# Patient Record
Sex: FEMALE | Race: WHITE | Employment: UNEMPLOYED | URBAN - METROPOLITAN AREA
[De-identification: names, ages, dates, MRNs, and addresses within clinical notes are randomized per-mention and may not be internally consistent; named-entity substitution may affect disease eponyms.]

---

## 2022-06-24 ENCOUNTER — OFFICE VISIT (OUTPATIENT)
Dept: OBGYN CLINIC | Age: 32
End: 2022-06-24
Payer: COMMERCIAL

## 2022-06-24 VITALS
WEIGHT: 132.2 LBS | SYSTOLIC BLOOD PRESSURE: 110 MMHG | DIASTOLIC BLOOD PRESSURE: 60 MMHG | HEIGHT: 63 IN | BODY MASS INDEX: 23.42 KG/M2

## 2022-06-24 DIAGNOSIS — Z32.01 POSITIVE PREGNANCY TEST: ICD-10-CM

## 2022-06-24 DIAGNOSIS — O36.80X0 PREGNANCY WITH INCONCLUSIVE FETAL VIABILITY, SINGLE OR UNSPECIFIED FETUS: Primary | ICD-10-CM

## 2022-06-24 LAB
HCG, PREGNANCY, URINE, POC: POSITIVE
VALID INTERNAL CONTROL, POC: YES

## 2022-06-24 PROCEDURE — 99204 OFFICE O/P NEW MOD 45 MIN: CPT | Performed by: NURSE PRACTITIONER

## 2022-06-24 PROCEDURE — G8427 DOCREV CUR MEDS BY ELIG CLIN: HCPCS | Performed by: NURSE PRACTITIONER

## 2022-06-24 PROCEDURE — 1036F TOBACCO NON-USER: CPT | Performed by: NURSE PRACTITIONER

## 2022-06-24 PROCEDURE — G8420 CALC BMI NORM PARAMETERS: HCPCS | Performed by: NURSE PRACTITIONER

## 2022-06-24 PROCEDURE — 76817 TRANSVAGINAL US OBSTETRIC: CPT | Performed by: NURSE PRACTITIONER

## 2022-06-24 PROCEDURE — 81025 URINE PREGNANCY TEST: CPT | Performed by: NURSE PRACTITIONER

## 2022-06-24 RX ORDER — LAMOTRIGINE 200 MG/1
200 TABLET ORAL 2 TIMES DAILY
COMMUNITY

## 2022-06-24 NOTE — PROGRESS NOTES
The patient is a 34 y.o. No obstetric history on file. who is seen to discuss her new pregnancy. Pt denies any current  cramping, urinary symptoms, or vaginal bleeding. She is currently taking an over the counter PNV with DHA and folic acid. She states she is having some LLQ discomfort, comes and goes but is present and noticeable. This pregnancy has not been confirmed IUP. Reports overall regular and monthly cycles, but sometimes had longer cycle intervals. PT reports she had a neg UPT on . She did not get a period in May. Pt reports she was not having sex during the time for her to conceive to be 8wks pregnant as calculated by lmp, believes she is not that far along. With hx SAB around 8wks, had a D&C.  2020- living child . LMP: 22   VINICIO based off of LMP: 23  GA today: Liu Zac findings 21  +IUP  GS seen measuring 5w4d  YS visualized  ROV visualized with follicles and CL cyst  LOV is visualized with follicles  Uterus is antevered  Cx= 3.73cm      HISTORY:    No obstetric history on file. No LMP recorded. Sexual History:  single partner, contraception - none  Contraception:  none  No current outpatient medications on file prior to visit. No current facility-administered medications on file prior to visit. ROS:  Feeling well. No dyspnea or chest pain on exertion. No abdominal pain, change in bowel habits, black or bloody stools. No urinary tract symptoms. GYN ROS: she complains of early pregnancy with LLQ discomfort. Felipe Steel PHYSICAL EXAM:  There were no vitals taken for this visit. The patient appears well, alert, oriented x 3, in no distress. Exam deferred, talk only    ASSESSMENT:  No diagnosis found. PLAN:  Dos and Don'ts of pregnancy discussed. Pain, bleeding precautions given  Continue PNV and lamictal, disc benefit>risk of this medication.   Pt reports she is leaving town next week for the next month and will be unable to rtc at 8wks for viability scan and nob talk. We will bring her back upon her return Aug1 for nob talk/us  In the mean time, bleeding and pain precautions given and she will seek care with providers in [facility] where she will be for the next month should she experience bleeding or increased pain. All questions answered  Diagnosis explained in detail, including differential    No orders of the defined types were placed in this encounter. Supervising physician is Dr. Radha Sutherland.   45 min chart review, US review, counseling and documentaiton

## 2022-07-15 LAB
ABO, EXTERNAL RESULT: NORMAL
C. TRACHOMATIS, EXTERNAL RESULT: NEGATIVE
HEP B, EXTERNAL RESULT: NORMAL
HEPATITIS C ANTIBODY, EXTERNAL RESULT: NORMAL
HIV, EXTERNAL RESULT: NORMAL
N. GONORRHOEAE, EXTERNAL RESULT: NEGATIVE
RH FACTOR, EXTERNAL RESULT: POSITIVE
RPR, EXTERNAL RESULT: NORMAL

## 2022-08-03 ENCOUNTER — TELEPHONE (OUTPATIENT)
Dept: OBGYN CLINIC | Age: 32
End: 2022-08-03

## 2022-08-03 DIAGNOSIS — O99.351 SEIZURE DISORDER IN PREGNANCY, ANTEPARTUM, FIRST TRIMESTER (HCC): Primary | ICD-10-CM

## 2022-08-03 DIAGNOSIS — G40.909 SEIZURE DISORDER IN PREGNANCY, ANTEPARTUM, FIRST TRIMESTER (HCC): Primary | ICD-10-CM

## 2022-08-03 NOTE — TELEPHONE ENCOUNTER
Per Tc Garza can offer appointment. Patient to have records with her from previous OB practice, or will have them faxed prior to appt. Referral placed to [facility] so patient can get in for NT scan in timely matter.

## 2022-08-03 NOTE — TELEPHONE ENCOUNTER
Patient calling stating she was seen here previously at 5 weeks pregnant. She was in between moving here from [place]. She was seen in [place] and had initial [address] and blood work. VINICIO 2/22/23. She is currently 11 weeks. She is calling because she is know here for remainder of pregnancy. She is asking to make appointment to come in. She is currently taking Lamictal and has been seeing a Neurologist in [place].

## 2022-08-09 DIAGNOSIS — O09.91 HIGH-RISK PREGNANCY IN FIRST TRIMESTER: ICD-10-CM

## 2022-08-09 PROBLEM — N39.0 UTI (URINARY TRACT INFECTION): Status: ACTIVE | Noted: 2022-08-09

## 2022-08-09 PROBLEM — O09.90 HIGH-RISK PREGNANCY: Status: ACTIVE | Noted: 2022-08-09

## 2022-08-09 PROBLEM — G40.909 EPILEPSY (HCC): Status: ACTIVE | Noted: 2022-08-09

## 2022-08-09 RX ORDER — LANOLIN ALCOHOL/MO/W.PET/CERES
400 CREAM (GRAM) TOPICAL DAILY
COMMUNITY

## 2022-08-10 ENCOUNTER — INITIAL PRENATAL (OUTPATIENT)
Dept: OBGYN CLINIC | Age: 32
End: 2022-08-10

## 2022-08-10 VITALS — BODY MASS INDEX: 25.33 KG/M2 | WEIGHT: 143 LBS

## 2022-08-10 DIAGNOSIS — O09.91 HIGH-RISK PREGNANCY IN FIRST TRIMESTER: ICD-10-CM

## 2022-08-10 DIAGNOSIS — Z34.81 PRENATAL CARE, SUBSEQUENT PREGNANCY, FIRST TRIMESTER: ICD-10-CM

## 2022-08-10 DIAGNOSIS — Z3A.12 12 WEEKS GESTATION OF PREGNANCY: ICD-10-CM

## 2022-08-10 DIAGNOSIS — Z87.440 HISTORY OF UTI: Primary | ICD-10-CM

## 2022-08-10 NOTE — PROGRESS NOTES
Carolina Herrera G3, P1 presents to the office today for NOB talk/transfer in. EDC is 2/22/23 based off of outside records. Patient education was discussed including: nutrition, appropriate weight gain, diet, exercise, travel, hospital classes, breastfeeding/lactation services, flu vaccine, Tdap, glucola, GBS, and Corona Virus and Zika precautions. Genetic testing discussed in depth and patient elects NT and NIPT-drawn today. Patients past medical history is significant for seizures-sees Neuro in [place], but has not seen since 2020, currently taking Lamictal 200 mg bid. She is scheduled to see [facility] on 8/18. She had ecoli UTI and was treated, repeat UC today. She is to return to the office in 1-2 weeks for NOB exam. All questions answered and she voiced full understanding. She is encouraged to call the office with any questions or concerns.

## 2022-08-13 LAB
BACTERIA SPEC CULT: NORMAL
BACTERIA SPEC CULT: NORMAL
SERVICE CMNT-IMP: NORMAL

## 2022-08-17 ENCOUNTER — ROUTINE PRENATAL (OUTPATIENT)
Dept: OBGYN CLINIC | Age: 32
End: 2022-08-17

## 2022-08-17 VITALS — WEIGHT: 144.8 LBS | SYSTOLIC BLOOD PRESSURE: 128 MMHG | DIASTOLIC BLOOD PRESSURE: 72 MMHG | BODY MASS INDEX: 25.65 KG/M2

## 2022-08-17 DIAGNOSIS — Z3A.13 13 WEEKS GESTATION OF PREGNANCY: ICD-10-CM

## 2022-08-17 DIAGNOSIS — Z34.82 PRENATAL CARE, SUBSEQUENT PREGNANCY, SECOND TRIMESTER: ICD-10-CM

## 2022-08-17 DIAGNOSIS — Z11.3 SCREENING EXAMINATION FOR STD (SEXUALLY TRANSMITTED DISEASE): ICD-10-CM

## 2022-08-17 DIAGNOSIS — Z11.51 SCREENING FOR HUMAN PAPILLOMAVIRUS (HPV): Primary | ICD-10-CM

## 2022-08-17 DIAGNOSIS — Z12.4 SCREENING FOR CERVICAL CANCER: ICD-10-CM

## 2022-08-17 DIAGNOSIS — Z13.89 SCREENING FOR GENITOURINARY CONDITION: ICD-10-CM

## 2022-08-17 DIAGNOSIS — G40.909 NONINTRACTABLE EPILEPSY WITHOUT STATUS EPILEPTICUS, UNSPECIFIED EPILEPSY TYPE (HCC): ICD-10-CM

## 2022-08-17 PROBLEM — F90.9 ATTENTION DEFICIT HYPERACTIVITY DISORDER: Status: ACTIVE | Noted: 2022-08-17

## 2022-08-17 PROBLEM — O98.511 HERPES INFECTION DURING PREGNANCY IN FIRST TRIMESTER: Status: ACTIVE | Noted: 2022-08-17

## 2022-08-17 PROBLEM — B00.9 HERPES INFECTION DURING PREGNANCY IN FIRST TRIMESTER: Status: ACTIVE | Noted: 2022-08-17

## 2022-08-17 PROBLEM — O99.351 EPILEPSY COMPLICATING PREGNANCY IN FIRST TRIMESTER (HCC): Status: ACTIVE | Noted: 2022-08-09

## 2022-08-17 PROBLEM — O09.91 HIGH-RISK PREGNANCY IN FIRST TRIMESTER: Status: ACTIVE | Noted: 2022-08-09

## 2022-08-17 LAB
GLUCOSE URINE, POC: NEGATIVE
PROTEIN,URINE, POC: NEGATIVE

## 2022-08-17 PROCEDURE — 99902 PR PRENATAL VISIT: CPT | Performed by: OBSTETRICS & GYNECOLOGY

## 2022-08-17 RX ORDER — VALACYCLOVIR HYDROCHLORIDE 1 G/1
TABLET, FILM COATED ORAL
COMMUNITY
End: 2022-08-18

## 2022-08-17 RX ORDER — DEXTROAMPHETAMINE SACCHARATE, AMPHETAMINE ASPARTATE, DEXTROAMPHETAMINE SULFATE AND AMPHETAMINE SULFATE 5; 5; 5; 5 MG/1; MG/1; MG/1; MG/1
TABLET ORAL
COMMUNITY
End: 2022-08-18

## 2022-08-17 NOTE — PROGRESS NOTES
Ms. Amber Leon  presents today for her initial OB exam.  Last week has nipt drawn. Seeing mfm tomorrow. Her last preg in [place] saw neurology and mfm. Had lamictal levels checked. She has kept same neurologist in [address] [facility] [place]. Pap smear:2 years ago, will repeat today  Genetic: performed last week     Patient's last menstrual period was 2022 (exact date). Estimated Date of Delivery: 23  OB History:   OB History    Para Term  AB Living   3 1 1 0 1 1   SAB IAB Ectopic Molar Multiple Live Births   1 0 0 0 0 1      # Outcome Date GA Lbr Ousmane/2nd Weight Sex Delivery Anes PTL Lv   3 Current            2 Term 20 39w0d  7 lb (3.175 kg) F Vag-Spont EPI  MOHAMUD   1 SAB 2019 8w0d              Past Gyn History:   GYN History       Cycles 30-42 days. No abnl pap or stis. Allergies:   No Known Allergies    Medication History:  Current Outpatient Medications   Medication Sig Dispense Refill    folic acid (FOLVITE) 059 MCG tablet Take 400 mcg by mouth in the morning. lamoTRIgine (LAMICTAL) 200 MG tablet Take 200 mg by mouth daily      Prenat-Fe Carbonyl-FA-Omega 3 (ONE-A-DAY WOMENS PRENATAL 1 PO) Take by mouth       No current facility-administered medications for this visit. Past Medical History:  Past Medical History:   Diagnosis Date    Epilepsy ([address]    Probably has twitches daily but 5 years since last big grand mal seizure. Did not adjust dose of lamictal last pregnancy. Past Surgical History:  Past Surgical History:   Procedure Laterality Date    KNEE ARTHROSCOPY         Social History:  Social History     Socioeconomic History    Marital status:  - 2 years      Spouse name: Not on file    Number of children: Not on file    Years of education: Not on file    Highest education level: Not on file   Occupational History    Homemaker now.   Was a  in [place]    Tobacco Use    Smoking status: Never    Smokeless tobacco: Never   Vaping Use    Vaping Use: Never used   Substance and Sexual Activity    Alcohol use: Not Currently    Drug use: Never    Sexual activity: Yes     Partners: Male   Other Topics Concern    Exercise:  hot yoga in past    Social History Narrative    Not on file     Social Determinants of Health     Financial Resource Strain: Not on file   Food Insecurity: Not on file   Transportation Needs: Not on file   Physical Activity: Not on file   Stress: Not on file   Social Connections: Not on file   Intimate Partner Violence: Not on file   Housing Stability: Not on file       Family History:  Family History   Problem Relation Age of Onset    Heart Attack Paternal Grandfather     Heart Attack Paternal Grandmother            Review of Systems      A comprehensive review of systems was negative except for that written in the history of present illness. /72   Wt 144 lb 12.8 oz (65.7 kg)   LMP 04/26/2022 (Exact Date)   BMI 25.65 kg/m²   Protein, Urine, POC   Date Value Ref Range Status   08/17/2022 Negative Negative Final     General: well nourished well developed female in nad   Heart rrr  Lungs cta b&s  Abdomen soft nontender. No enlargement of liver. Extremities: no calf pain  Pelvic exam: normal external genitalia, vulva, vagina, cervix, uterus and adnexa. Breasts: breasts appear normal, no suspicious masses, no skin or nipple changes or axillary nodes. Assessment and Plan:     Diagnoses and all orders for this visit:    Screening for human papillomavirus (HPV)  -     PAP IG, CT-NG-TV, Aptima HPV and rfx 90/51,91([id number]); Future  -     PAP IG, CT-NG-TV, Aptima HPV and rfx 50/48,19([id number])    Screening examination for STD (sexually transmitted disease)  -     PAP IG, CT-NG-TV, Aptima HPV and rfx 08/92,01([id number]);  Future  -     PAP IG, CT-NG-TV, Aptima HPV and rfx 69/67,59([id number])    Screening for cervical cancer  -     PAP IG, CT-NG-TV, Aptima HPV and rfx 14/98,52([id number]); Future  -     PAP IG, CT-NG-TV, Aptima HPV and rfx 29/37,95([id number])    Screening for genitourinary condition  -     AMB POC OB URINE DIP  -     Culture, Urine; Future  -     Culture, Urine    13 weeks gestation of pregnancy  -     AMB POC OB URINE DIP  -     PAP IG, CT-NG-TV, Aptima HPV and rfx 03/62,64([id number]); Future  -     Culture, Urine; Future  -     Culture, Urine  -     PAP IG, CT-NG-TV, Aptima HPV and rfx 16/18,45([id number])    Prenatal care, subsequent pregnancy, second trimester  -     AMB POC OB URINE DIP  -     PAP IG, CT-NG-TV, Aptima HPV and rfx 60/99,72([id number]); Future  -     Culture, Urine; Future  -     Culture, Urine  -     PAP IG, CT-NG-TV, Aptima HPV and rfx 16/18,45([id number])    Nonintractable epilepsy without status epilepticus, unspecified epilepsy type ([facility]  -     Lamotrigine Level; Future  -     Lamotrigine Level        Counseling was performed regarding expected weight gain, exercise, risks of PTL,  avoidance of cat feces and raw material.  Awaiting genetic testing results. All questions were answered. Has not  with lamictal.  Will check lamictal level. Consider neurology referral here. Mfm appt tomorrow. RTC 4 weeks.

## 2022-08-18 ENCOUNTER — ROUTINE PRENATAL (OUTPATIENT)
Dept: OBGYN CLINIC | Age: 32
End: 2022-08-18
Payer: COMMERCIAL

## 2022-08-18 VITALS — DIASTOLIC BLOOD PRESSURE: 72 MMHG | SYSTOLIC BLOOD PRESSURE: 118 MMHG

## 2022-08-18 DIAGNOSIS — G40.909: ICD-10-CM

## 2022-08-18 DIAGNOSIS — O99.351: ICD-10-CM

## 2022-08-18 DIAGNOSIS — B00.9 HERPES INFECTION DURING PREGNANCY IN FIRST TRIMESTER: ICD-10-CM

## 2022-08-18 DIAGNOSIS — F90.9 ATTENTION DEFICIT HYPERACTIVITY DISORDER (ADHD), UNSPECIFIED ADHD TYPE: ICD-10-CM

## 2022-08-18 DIAGNOSIS — Z36.82 NUCHAL TRANSLUCENCY OF FETUS ON PRENATAL ULTRASOUND: ICD-10-CM

## 2022-08-18 DIAGNOSIS — O98.511 HERPES INFECTION DURING PREGNANCY IN FIRST TRIMESTER: ICD-10-CM

## 2022-08-18 DIAGNOSIS — O09.91 HIGH-RISK PREGNANCY IN FIRST TRIMESTER: Primary | ICD-10-CM

## 2022-08-18 LAB — LAMOTRIGINE SERPL-MCNC: <1 UG/ML (ref 2–20)

## 2022-08-18 PROCEDURE — G8427 DOCREV CUR MEDS BY ELIG CLIN: HCPCS | Performed by: OBSTETRICS & GYNECOLOGY

## 2022-08-18 PROCEDURE — 99204 OFFICE O/P NEW MOD 45 MIN: CPT | Performed by: OBSTETRICS & GYNECOLOGY

## 2022-08-18 PROCEDURE — 1036F TOBACCO NON-USER: CPT | Performed by: OBSTETRICS & GYNECOLOGY

## 2022-08-18 PROCEDURE — 76801 OB US < 14 WKS SINGLE FETUS: CPT | Performed by: OBSTETRICS & GYNECOLOGY

## 2022-08-18 PROCEDURE — G8419 CALC BMI OUT NRM PARAM NOF/U: HCPCS | Performed by: OBSTETRICS & GYNECOLOGY

## 2022-08-18 PROCEDURE — 76813 OB US NUCHAL MEAS 1 GEST: CPT | Performed by: OBSTETRICS & GYNECOLOGY

## 2022-08-18 ASSESSMENT — PATIENT HEALTH QUESTIONNAIRE - PHQ9
SUM OF ALL RESPONSES TO PHQ9 QUESTIONS 1 & 2: 0
SUM OF ALL RESPONSES TO PHQ QUESTIONS 1-9: 0
SUM OF ALL RESPONSES TO PHQ QUESTIONS 1-9: 0
2. FEELING DOWN, DEPRESSED OR HOPELESS: 0
SUM OF ALL RESPONSES TO PHQ QUESTIONS 1-9: 0
SUM OF ALL RESPONSES TO PHQ QUESTIONS 1-9: 0
1. LITTLE INTEREST OR PLEASURE IN DOING THINGS: 0

## 2022-08-18 NOTE — PROGRESS NOTES
MFM Consultation    Tia Giraldo (: 1990) is a 34 y.o. Curry Sprain at 13w1d with 2023, by Ultrasound. Presents for evaluation of the following chief complaint(s):  Pregnancy Ultrasound and High Risk Pregnancy (NT, Hx Epilepsy, Hx ADD)     Patient is not working outside of home. Pt has long-standing hx epilepsy, last grand mal 5yr ago, has tremors when fatigued. Previously cared for in [facility], does not have neuro locally. No issues in prior pregnancy. Hx ADHD, not on meds currently; not impacting ADL significantly. Scheduled to see Primary OB ([facility]) on 22. No HAs, edema. Denies preeclamptic symptoms. No bleeding, LOF, cramping, ctxs, or vaginal pressure. Review of Systems - per HPI; otherwise unremarkable. History reviewed and updated as needed. Exam:   Vitals:    22 1555   BP: 118/72     Ultrasound: Please see formal ultrasound report under imaging tab. ASSESSMENT/PLAN:  Patient Active Problem List    Diagnosis Date Noted    High-risk pregnancy in first trimester 2022     Priority: High     NIPT-drawn 8/10. Scheduled to see MFM on  for NT. Transfer in @ 12w0d from [facility] in [place], [place]  22 at [facility]: Normal NT and nasal bone. Genetic counseling done by MD. NIPT drawn 8/10. F/U MFM 20 weeks anatomy and echo  Low dose Aspirin 162  mg daily is recommended to be started at 12-16 weeks (some benefit seen with starting up to 28 weeks) for the prevention of preeclampsia  in high risk women.  (U.S. Preventative Services Task Force, Annals of Internal Medicine 0484)    Stop Aspirin at 36 weeks. Start Vitamin D 2000IU daily and Calcium 1000mg daily (or may take Viactiv calcium chews x 2 per day) to aid in protection of bones  and teeth.       Attention deficit hyperactivity disorder 2022     Priority: Medium    Epilepsy complicating pregnancy in first trimester [facility]) 2022     Priority: Medium     Hx of Epilepsy 2004  Stable on Lamictal 200mg-sees Neuro in [place] seen in July 2020. Last seizure 5+ years ago(Grand Mal) lamictal level every trimester. 8/18/22 [facility]: lamictal level pending, however pt had not taken her medication that day prior to draw. Rec establishing with local neurology. UTI (urinary tract infection) 08/09/2022     Priority: Low     7/15/22 50,000-100,000 E-Coli noted-patient was treated. [facility]-         Genetic counseling was performed by physician after reviewing patient's genetic history. The patient's Down syndrome age associated risk, as well as, risks of additional aneuploidy and genetic syndromes, are reduced by approximately 50% with a normal first trimester anatomy including, nuchal translucency and nasal bone. Ultrasound alone does not rule out all abnormalities of genetics and development. Maternal serum screening for aneuploidy was discussed with the patient including first trimester SALBADOR-A/hCG, second trimester Quad screen (either in isolation or sequential with SALBADOR-A) as well as non-invasive prenatal testing (NIPT) for aneuploidy from a maternal blood sample. Positive predictive and negative predictive values for these tests were explained, questions answered. Patient understands that these are screening tests that only assesses risk for select abnormalities (trisomies 15, 25, and 21, and sex chromosome abnormalities (NIPT), as well as markers for placental health (SALBADOR-A) and risk for open neural tube defects (quad)). NIPT is designed for high risk populations, but should be considered by all patients who desire the current best option for screening for applicable genetic abnormalities. Limitations of technology discussed based on maternal age, technical aspects of tests, and maternal BMI reviewed. All questions answered and concerns discussed. Patient elected to proceed with NIPT previously at [facility] office- results pending.       2) Seizure Disorders/Epilepsy in Pregnancy    Low serum folate levels in women with epilepsy are independently associated with an increased risk of major fetal malformations. ACOG recommends supplementation with 4mg per day of folic acid in populations at high risk for neural tube defects, including those with epilepsy and on antiepileptic medications. It is preferable for this medication to begin prior to conception. Monotherapy should be the goal of epilepsy treatment of women trying to get pregnant. Children of women taking multiple antiepileptic agents are at greater risk for congenital malformations and developmental delays (6-8.6% incidence). Antiepileptic drug regimen should be optimized preconception if possible. Once a woman becomes pregnant, changes in antiepileptic therapy should not be undertaken for the sole purpose of reducing teratogenic risk. Such a change usually occurs too late in pregnancy to make an appreciable difference in fetal exposure and could precipitate maternal seizures that might be equally damaging to the fetus. If possible, the older antiseizure medications (Valproic Acid, Phenytoin and Carbamazepine) should be stopped prior to pregnancy and another agent started. These medications have an association of spinal defects (VA and C) and facial clefting or heart defects (P). The newer atypical agents (keppra and lamictal) are felt to be safer in pregnancy with reduced incidence of major malformations. Lamotrigine may be associated with cleft lip/palate. However, none of the newer anti-epileptic agents have adequate numbers of exposed pregnancies to make recommendations. Due to metabolic changes in pregnancy, patients on keppra and lamictal should have drug levels monitored throughout pregnancy.      Screening for ONTD may be done in two ways  Level 2 anatomy ultrasound should be performed at 18 to 20 weeks to evaluate for neural tube defects, cleft lip and palate, heart anomalies, and for a general fetal anatomical survey. Elevated serum AFP is associated with neural tube defects and other fetal abnormalities (eg ventral wall defects, congenital nephrosis). Measurement of the serum AFP concentration or amniocentesis for alpha-fetoprotein should be performed between 14 and 16 weeks, especially in women treated with valproate and carbamazepine. However, this laboratory value does not improve detection over that of a level 2 ultrasound. Lamictal level will need to be adjusted as pregnancy progresses. Level pending, however, was trough not peak draw. 3) Pt with long history of ADHD. This has been well-controlled prior to pregnancy. Patient currently uses many nonpharmacologic interventions with sufficient control to perform ADL sufficiently. Most data on this class of medication is combined with research of illicit amphetamine use this clouds the ability to identify true impact on fetal development. Risk of growth lag, unknown long-term impact on fetal brain reviewed. All questions answered. At this time, no pharmacologic therapy necessary. Addressed normal pregnancy complaints, reassured and offered suggestions for care  Reviewed gestational age precautions and activity goals/limitations  Nutritional counseling as well as specific goals based on current maternal and fetal status  Options for GERD therapy if becomes symptomatic over course of pregnancy  Gestational age appropriate preventive care regarding communicable disease transmission and vaccines as appropriate (including flu, TDaP, and COVID.)  Additional plans and concerns as documented in problem list.   All questions answered and concerns discussed.       I have spent 50 minutes reviewing previous notes, test results and face to face with the patient discussing the diagnosis and importance of compliance with the treatment plan, in addition to ultrasound findings, as well as documenting on the day of the visit (08/18/2022)      Return in 7 week(s)  Anatomy    Patient Active Problem List   Diagnosis Code    Epilepsy complicating pregnancy in first trimester ([facility] O99.351, G40.909    High-risk pregnancy in first trimester O09.91    UTI (urinary tract infection) N39.0    Attention deficit hyperactivity disorder F90.9       An electronic signature was used to authenticate this note.   Ermelinda Hwang MD

## 2022-08-20 LAB
BACTERIA SPEC CULT: NORMAL
SERVICE CMNT-IMP: NORMAL

## 2022-08-22 ENCOUNTER — TELEPHONE (OUTPATIENT)
Dept: OBGYN CLINIC | Age: 32
End: 2022-08-22

## 2022-08-22 NOTE — TELEPHONE ENCOUNTER
----- Message from Ran Lujan DO sent at 8/22/2022 10:14 AM EDT -----  Regarding: RE: lamictal  Well, need to take meds. Repeat level 2 weeks if pt compliant. Ragini vargas   ----- Message -----  From: Jasonsudarshan Solitario  Sent: 8/19/2022  12:47 PM EDT  To: Ragini Vargas DO  Subject: lamictal                                         Patient states it had been 48 hours since she had taken her meds when she had levels checked. She states she was not compliant with taking the meds. Since then has been compliant with taking. Just want to make sure you still want to increase?  ----- Message -----  From: Ran Lujan DO  Sent: 8/19/2022  12:23 PM EDT  To: Hannah Gonzalez    Her lamictal level is low. Needs to go up to 250mg daily. Repeat level 4 weeks. ----- Message -----  From: Fide Lynn MD  Sent: 8/19/2022  10:39 AM EDT  To: Ragini Vargas DO    I agree on both counts. (Will do yesterday's note soon. )    ----- Message -----  From: Ran Lujan DO  Sent: 8/18/2022   5:48 PM EDT  To: Fide Lynn MD    She is taking lamictal for seizure do. I think she needs neurology here. I am going to increase her dose from 200mg to 225? Once source said max dose 200mg per day and another said 400mg?

## 2022-08-22 NOTE — TELEPHONE ENCOUNTER
Called patient and informed to be compliant with taking meds, and we will recheck levels at next visit. Patient v/u.

## 2022-08-23 LAB
C TRACH RRNA CVX QL NAA+PROBE: NEGATIVE
CYTOLOGIST CVX/VAG CYTO: NORMAL
CYTOLOGY CVX/VAG DOC THIN PREP: NORMAL
HPV APTIMA: NEGATIVE
Lab: NORMAL
N GONORRHOEA RRNA CVX QL NAA+PROBE: NEGATIVE
PATH REPORT.FINAL DX SPEC: NORMAL
STAT OF ADQ CVX/VAG CYTO-IMP: NORMAL
T VAGINALIS RRNA SPEC QL NAA+PROBE: NEGATIVE

## 2022-09-12 ENCOUNTER — ROUTINE PRENATAL (OUTPATIENT)
Dept: OBGYN CLINIC | Age: 32
End: 2022-09-12

## 2022-09-12 VITALS — WEIGHT: 152.8 LBS | BODY MASS INDEX: 27.07 KG/M2 | SYSTOLIC BLOOD PRESSURE: 112 MMHG | DIASTOLIC BLOOD PRESSURE: 60 MMHG

## 2022-09-12 DIAGNOSIS — Z13.89 SCREENING FOR GENITOURINARY CONDITION: ICD-10-CM

## 2022-09-12 DIAGNOSIS — O99.352 EPILEPSY COMPLICATING PREGNANCY IN SECOND TRIMESTER (HCC): ICD-10-CM

## 2022-09-12 DIAGNOSIS — G40.909 EPILEPSY COMPLICATING PREGNANCY IN SECOND TRIMESTER (HCC): ICD-10-CM

## 2022-09-12 DIAGNOSIS — O09.92 HIGH-RISK PREGNANCY IN SECOND TRIMESTER: Primary | ICD-10-CM

## 2022-09-12 DIAGNOSIS — Z3A.16 16 WEEKS GESTATION OF PREGNANCY: ICD-10-CM

## 2022-09-12 LAB
GLUCOSE URINE, POC: NEGATIVE
PROTEIN,URINE, POC: NEGATIVE

## 2022-09-12 PROCEDURE — 99902 PR PRENATAL VISIT: CPT | Performed by: OBSTETRICS & GYNECOLOGY

## 2022-09-14 LAB — LAMOTRIGINE SERPL-MCNC: 2.8 UG/ML (ref 2–20)

## 2022-09-21 PROBLEM — N39.0 UTI (URINARY TRACT INFECTION): Status: RESOLVED | Noted: 2022-08-09 | Resolved: 2022-09-21

## 2022-10-04 ENCOUNTER — ROUTINE PRENATAL (OUTPATIENT)
Dept: OBGYN CLINIC | Age: 32
End: 2022-10-04
Payer: COMMERCIAL

## 2022-10-04 VITALS — DIASTOLIC BLOOD PRESSURE: 59 MMHG | SYSTOLIC BLOOD PRESSURE: 107 MMHG

## 2022-10-04 DIAGNOSIS — O09.92 HIGH-RISK PREGNANCY IN SECOND TRIMESTER: ICD-10-CM

## 2022-10-04 DIAGNOSIS — G40.909 EPILEPSY AFFECTING PREGNANCY IN SECOND TRIMESTER (HCC): ICD-10-CM

## 2022-10-04 DIAGNOSIS — O44.42 LOW-LYING PLACENTA IN SECOND TRIMESTER: ICD-10-CM

## 2022-10-04 DIAGNOSIS — O99.352 EPILEPSY AFFECTING PREGNANCY IN SECOND TRIMESTER (HCC): ICD-10-CM

## 2022-10-04 PROCEDURE — G8484 FLU IMMUNIZE NO ADMIN: HCPCS | Performed by: OBSTETRICS & GYNECOLOGY

## 2022-10-04 PROCEDURE — G8419 CALC BMI OUT NRM PARAM NOF/U: HCPCS | Performed by: OBSTETRICS & GYNECOLOGY

## 2022-10-04 PROCEDURE — 93325 DOPPLER ECHO COLOR FLOW MAPG: CPT | Performed by: OBSTETRICS & GYNECOLOGY

## 2022-10-04 PROCEDURE — 99214 OFFICE O/P EST MOD 30 MIN: CPT | Performed by: OBSTETRICS & GYNECOLOGY

## 2022-10-04 PROCEDURE — G8427 DOCREV CUR MEDS BY ELIG CLIN: HCPCS | Performed by: OBSTETRICS & GYNECOLOGY

## 2022-10-04 PROCEDURE — 76825 ECHO EXAM OF FETAL HEART: CPT | Performed by: OBSTETRICS & GYNECOLOGY

## 2022-10-04 PROCEDURE — 76811 OB US DETAILED SNGL FETUS: CPT | Performed by: OBSTETRICS & GYNECOLOGY

## 2022-10-04 PROCEDURE — 1036F TOBACCO NON-USER: CPT | Performed by: OBSTETRICS & GYNECOLOGY

## 2022-10-04 NOTE — PROGRESS NOTES
MFM Consultation    Janis Palmer (: 1990) is a 34 y.o. Mushtaq Ding at [place] [address] with 2023, by Ultrasound. Presents for evaluation of the following chief complaint(s):  Pregnancy Ultrasound and High Risk Pregnancy (Hx Epilepsy, Hx ADD)     Patient is not working outside of home; spouse and 1yo Angélica Lank present today. Pt has long-standing hx epilepsy, last grand mal 5yr ago, has tremors when fatigued. Previously cared for in [facility], does not have neuro locally. No issues in prior pregnancy. Hoping to travel to [facility] in next couple of months to visit. Hx ADHD, not on meds currently; not impacting ADL significantly. Scheduled to see Primary OB ([facility]) on 10/11/22. No HAs, edema. Denies preeclamptic symptoms. No bleeding, LOF, cramping, ctxs, or vaginal pressure. Reports fetal movement. Recent significant poison ivy for ~1mo. Now improving. Interval history since prior appt reviewed and updated as indicated. Review of Systems - per HPI; otherwise unremarkable. Exam:     Vitals:    10/04/22 1510   BP: (!) 107/59     Ultrasound: Please see formal ultrasound report under imaging tab. ASSESSMENT/PLAN:  Patient Active Problem List    Diagnosis Date Noted    High-risk pregnancy in second trimester 2022     Priority: High     NIPT-low risk  Scheduled to see MFM on  for NT. Transfer in @ 12w0d from [facility] in [place], [place]  22 at [facility]: Normal NT and nasal bone. Genetic counseling done by MD. NIPT low risk  10/04/22 at [facility]: Normal anatomy and echo; AC 80%, CAESAR WNL, Dopplers WNL    F/U MFM 8-10 weeks growth and placenta        Attention deficit hyperactivity disorder 2022     Priority: Medium    Epilepsy affecting pregnancy in second trimester ([facility] [address] 2022     Priority: Medium     Hx of Epilepsy   Stable on Lamictal 200mg-sees Neuro in [facility] seen in 2020. Last seizure 5+ years ago(Grand Mal) lamictal level every trimester. 8/18/22 [facility]: lamictal level pending, however pt had not taken her medication that day prior to draw. 8/17/22-1.0-patient has not been compliant on taking medication. 9/12/22 2.8, therapeutic   10/04/22 at [facility]: Lamictal 200 mg BID, recheck early third trimester. Anatomy reassuring. Rec establishing with local neurology. Low-lying placenta in second trimester 10/04/2022     Priority: Low     10/04/22 at [facility]: likely to resolve, bleeding precautions. Lamictal appropriate level. Normal appearing anatomy. Travel precautions reviewed. Mood evaluated today; reviewed risk of peripartum worsening. No significant ADHD symptoms. Recommend lifestyle/behavioral modifications to enhance mood (exercise, sunshine, mood apps, nutritional modifications, etc). Addressed normal pregnancy complaints, reassured and offered suggestions for care  Reviewed gestational age precautions and activity goals/limitations  Nutritional counseling as well as specific goals based on current maternal and fetal status  Options for GERD therapy if becomes symptomatic over course of pregnancy  Gestational age appropriate preventive care regarding communicable disease transmission and vaccines as appropriate (including flu, TDaP, and COVID.)  Additional plans and concerns as documented in problem list.   All questions answered and concerns discussed. An electronic signature was used to authenticate this note. Gibson Asencio MD    I have spent 32 minutes reviewing previous notes, test results and face to face with the patient discussing the diagnosis and importance of compliance with the treatment plan, in addition to ultrasound findings, as well as documenting on the day of the visit (10/04/2022). Return in about 9 weeks (around 12/6/2022) for placental evaluation, growth.       Patient Active Problem List   Diagnosis Code    [facility]ile[facility] in second [facility] O99.352, G40.909 High-risk pregnancy in second trimester O09.92    Attention deficit hyperactivity disorder F90.9    Low-lying placenta in second trimester O44.42

## 2022-10-11 ENCOUNTER — ROUTINE PRENATAL (OUTPATIENT)
Dept: OBGYN CLINIC | Age: 32
End: 2022-10-11

## 2022-10-11 VITALS — DIASTOLIC BLOOD PRESSURE: 64 MMHG | BODY MASS INDEX: 28.63 KG/M2 | WEIGHT: 161.6 LBS | SYSTOLIC BLOOD PRESSURE: 105 MMHG

## 2022-10-11 DIAGNOSIS — O09.92 HIGH-RISK PREGNANCY IN SECOND TRIMESTER: Primary | ICD-10-CM

## 2022-10-11 DIAGNOSIS — Z13.89 SCREENING FOR GENITOURINARY CONDITION: ICD-10-CM

## 2022-10-11 DIAGNOSIS — Z3A.20 20 WEEKS GESTATION OF PREGNANCY: ICD-10-CM

## 2022-10-11 LAB
GLUCOSE URINE, POC: NEGATIVE
PROTEIN,URINE, POC: NEGATIVE

## 2022-10-11 PROCEDURE — 99902 PR PRENATAL VISIT: CPT | Performed by: OBSTETRICS & GYNECOLOGY

## 2022-10-11 RX ORDER — CALCIUM CARBONATE 500(1250)
500 TABLET ORAL DAILY
COMMUNITY

## 2022-10-11 RX ORDER — FERROUS SULFATE 325(65) MG
325 TABLET ORAL
COMMUNITY

## 2022-11-11 ENCOUNTER — ROUTINE PRENATAL (OUTPATIENT)
Dept: OBGYN CLINIC | Age: 32
End: 2022-11-11

## 2022-11-11 VITALS — SYSTOLIC BLOOD PRESSURE: 122 MMHG | DIASTOLIC BLOOD PRESSURE: 74 MMHG | WEIGHT: 169.4 LBS | BODY MASS INDEX: 30.01 KG/M2

## 2022-11-11 DIAGNOSIS — O09.92 HIGH-RISK PREGNANCY IN SECOND TRIMESTER: Primary | ICD-10-CM

## 2022-11-11 DIAGNOSIS — O99.352 EPILEPSY AFFECTING PREGNANCY IN SECOND TRIMESTER (HCC): ICD-10-CM

## 2022-11-11 DIAGNOSIS — Z3A.25 25 WEEKS GESTATION OF PREGNANCY: ICD-10-CM

## 2022-11-11 DIAGNOSIS — G40.909 EPILEPSY AFFECTING PREGNANCY IN SECOND TRIMESTER (HCC): ICD-10-CM

## 2022-11-11 DIAGNOSIS — O44.42 LOW-LYING PLACENTA IN SECOND TRIMESTER: ICD-10-CM

## 2022-11-11 DIAGNOSIS — Z13.89 SCREENING FOR GENITOURINARY CONDITION: ICD-10-CM

## 2022-11-11 LAB
GLUCOSE URINE, POC: NEGATIVE
PROTEIN,URINE, POC: NEGATIVE

## 2022-11-11 PROCEDURE — 99902 PR PRENATAL VISIT: CPT | Performed by: OBSTETRICS & GYNECOLOGY

## 2022-12-02 ENCOUNTER — ROUTINE PRENATAL (OUTPATIENT)
Dept: OBGYN CLINIC | Age: 32
End: 2022-12-02

## 2022-12-02 VITALS — SYSTOLIC BLOOD PRESSURE: 110 MMHG | WEIGHT: 175.6 LBS | DIASTOLIC BLOOD PRESSURE: 60 MMHG | BODY MASS INDEX: 31.11 KG/M2

## 2022-12-02 DIAGNOSIS — Z3A.28 28 WEEKS GESTATION OF PREGNANCY: ICD-10-CM

## 2022-12-02 DIAGNOSIS — Z34.83 PRENATAL CARE, SUBSEQUENT PREGNANCY, THIRD TRIMESTER: ICD-10-CM

## 2022-12-02 DIAGNOSIS — Z13.0 SCREENING FOR IRON DEFICIENCY ANEMIA: ICD-10-CM

## 2022-12-02 DIAGNOSIS — Z13.89 SCREENING FOR GENITOURINARY CONDITION: Primary | ICD-10-CM

## 2022-12-02 DIAGNOSIS — Z13.1 SCREENING FOR DIABETES MELLITUS: ICD-10-CM

## 2022-12-02 LAB
GLUCOSE URINE, POC: NEGATIVE
HGB BLD-MCNC: 12.6 G/DL (ref 11.7–15.4)
PROTEIN,URINE, POC: NEGATIVE

## 2022-12-03 LAB — GLUCOSE 1 HOUR: 131 MG/DL

## 2022-12-05 ENCOUNTER — TELEPHONE (OUTPATIENT)
Dept: OBGYN CLINIC | Age: 32
End: 2022-12-05

## 2022-12-05 NOTE — TELEPHONE ENCOUNTER
Patient called back to schedule her appointment but had some questions about her test results first, will you call her to discuss further? Please advise.

## 2022-12-07 ENCOUNTER — ROUTINE PRENATAL (OUTPATIENT)
Dept: OBGYN CLINIC | Age: 32
End: 2022-12-07
Payer: COMMERCIAL

## 2022-12-07 VITALS — DIASTOLIC BLOOD PRESSURE: 58 MMHG | SYSTOLIC BLOOD PRESSURE: 123 MMHG

## 2022-12-07 DIAGNOSIS — O09.93 HIGH-RISK PREGNANCY IN THIRD TRIMESTER: Primary | ICD-10-CM

## 2022-12-07 DIAGNOSIS — Z13.32 ENCOUNTER FOR SCREENING FOR MATERNAL DEPRESSION: ICD-10-CM

## 2022-12-07 DIAGNOSIS — O44.43 LOW-LYING PLACENTA IN THIRD TRIMESTER: ICD-10-CM

## 2022-12-07 DIAGNOSIS — Z3A.29 29 WEEKS GESTATION OF PREGNANCY: ICD-10-CM

## 2022-12-07 DIAGNOSIS — G40.909 EPILEPSY AFFECTING PREGNANCY IN THIRD TRIMESTER (HCC): ICD-10-CM

## 2022-12-07 DIAGNOSIS — O99.213 OBESITY AFFECTING PREGNANCY IN THIRD TRIMESTER: ICD-10-CM

## 2022-12-07 DIAGNOSIS — O99.353 EPILEPSY AFFECTING PREGNANCY IN THIRD TRIMESTER (HCC): ICD-10-CM

## 2022-12-07 DIAGNOSIS — O43.193 ACCESSORY PLACENTA, THIRD TRIMESTER: ICD-10-CM

## 2022-12-07 PROCEDURE — G8484 FLU IMMUNIZE NO ADMIN: HCPCS | Performed by: OBSTETRICS & GYNECOLOGY

## 2022-12-07 PROCEDURE — G8419 CALC BMI OUT NRM PARAM NOF/U: HCPCS | Performed by: OBSTETRICS & GYNECOLOGY

## 2022-12-07 PROCEDURE — 1036F TOBACCO NON-USER: CPT | Performed by: OBSTETRICS & GYNECOLOGY

## 2022-12-07 PROCEDURE — 96127 BRIEF EMOTIONAL/BEHAV ASSMT: CPT | Performed by: OBSTETRICS & GYNECOLOGY

## 2022-12-07 PROCEDURE — G8427 DOCREV CUR MEDS BY ELIG CLIN: HCPCS | Performed by: OBSTETRICS & GYNECOLOGY

## 2022-12-07 PROCEDURE — 99214 OFFICE O/P EST MOD 30 MIN: CPT | Performed by: OBSTETRICS & GYNECOLOGY

## 2022-12-07 PROCEDURE — 76816 OB US FOLLOW-UP PER FETUS: CPT | Performed by: OBSTETRICS & GYNECOLOGY

## 2022-12-07 ASSESSMENT — PATIENT HEALTH QUESTIONNAIRE - PHQ9
SUM OF ALL RESPONSES TO PHQ QUESTIONS 1-9: 0
SUM OF ALL RESPONSES TO PHQ9 QUESTIONS 1 & 2: 0
SUM OF ALL RESPONSES TO PHQ QUESTIONS 1-9: 0
SUM OF ALL RESPONSES TO PHQ QUESTIONS 1-9: 0
1. LITTLE INTEREST OR PLEASURE IN DOING THINGS: 0
SUM OF ALL RESPONSES TO PHQ QUESTIONS 1-9: 0
2. FEELING DOWN, DEPRESSED OR HOPELESS: 0

## 2022-12-07 NOTE — PROGRESS NOTES
MFM Follow-up Visit    Romelia Jean (: 1990) is a 34 y.o. Mynor Feeler at 29w0d with 2023, by Ultrasound. Presents for evaluation of the following chief complaint(s):  Pregnancy Ultrasound and High Risk Pregnancy (Hx Epilepsy, Hx ADD, Low lying placenta, Obesity)     Patient is not working outside of home; spouse and 1yo Sloane Bile present today. Pt has long-standing hx epilepsy, last grand mal 5yr ago, has tremors when fatigued. Previously cared for in [place], does not have neuro locally. No issues in prior pregnancy. Has traveled to [place] since last visit, no further trips planned. Hx ADHD, not on meds currently; not impacting ADL significantly. Scheduled to see Primary OB ([facility]) on 22 for 3 hr GTT. No HAs, edema. Denies preeclamptic symptoms. No bleeding, LOF, cramping, ctxs, or vaginal pressure. Reports good fetal movement. Interval history since prior appt reviewed and updated as indicated. Review of Systems - per HPI; otherwise unremarkable. Exam:     Vitals:    22 0917   BP: (!) 123/58     Recent Mood: well  Recent Labs reviewed and addressed. Ultrasound: Please see formal ultrasound report under imaging tab. ASSESSMENT/PLAN:  Patient Active Problem List    Diagnosis Date Noted    High-risk pregnancy in third trimester 2022     Priority: High     NIPT-low risk  Scheduled to see MFM on  for NT. Transfer in @ 12w0d from [facility] in [place], [place]  22 at [facility]: Normal NT and nasal bone.   Genetic counseling done by MD. NIPT low risk  10/04/22 at [facility]: Normal anatomy and echo; AC 80%, CAESAR WNL, Dopplers WNL  22 [facility]: Appropriate fetal growth; Normal repeat echo; AC  32%, Overall 46%, CAESAR 17 cm, Dopplers WNL, BPP     No F/U MFM-refer back as needed          Accessory placenta, third trimester 2022     Priority: Medium    Obesity affecting pregnancy in third trimester 2022     Priority: Medium Attention deficit hyperactivity disorder 08/17/2022     Priority: Medium    Epilepsy affecting pregnancy in third trimester ([facility] [address] 08/09/2022     Priority: Medium     Hx of Epilepsy 2004  Stable on Lamictal 200mg-sees Neuro in [facility] seen in July 2020. Last seizure 5+ years ago(Grand Mal) lamictal level every trimester. 8/18/22 [facility]: lamictal level pending, however pt had not taken her medication that day prior to draw. 8/17/22-1.0-patient has not been compliant on taking medication. 9/12/22 2.8, therapeutic   10/04/22 at [facility]: Lamictal 200 mg BID, recheck early third trimester. Anatomy reassuring. 12/07/22 [facility]: Faye Rodriguez Lamictal 200mg BID. Rec establishing with local neurology. Redraw Lamictal levels in 3rd trimester       1) No seizures, doing well with lamictal.     2) Mood evaluated today; PHQ2 reviewed. Counseling re possibility of peripartum worsening. Mood Reassuring today     3) New finding of placental accessory lobe. Precautions for this reviewed. Addressed normal pregnancy complaints, reassured and offered suggestions for care  Reviewed gestational age precautions and activity goals/limitations  Nutritional counseling as well as specific goals based on current maternal and fetal status  Options for GERD therapy if becomes symptomatic over course of pregnancy  Gestational age appropriate preventive care regarding communicable disease transmission and vaccines as appropriate (including flu, TDaP, and COVID.)  Additional plans and concerns as documented in problem list.   All questions answered and concerns discussed. An electronic signature was used to authenticate this note. Mona Bishop MD    I have spent 34 minutes reviewing previous notes, test results and face to face with the patient discussing the diagnosis and importance of compliance with the treatment plan, in addition to ultrasound findings, as well as documenting on the day of the visit (12/07/2022).     Return if symptoms worsen or fail to improve. Patient Active Problem List   Diagnosis Code    Epilepsy affecting pregnancy in third trimester [facility]) O99.353, G40.909    High-risk pregnancy in third trimester O09.93    Attention deficit hyperactivity disorder F90.9    Obesity affecting pregnancy in third trimester O99.213    Accessory placenta, third trimester O43. 193     Visit Diagnoses         Codes    BMI 31.0-31.9,adult     Z68.31    29 weeks gestation of pregnancy     Z3A.29    Encounter for screening for maternal depression     Z13.32

## 2022-12-08 PROBLEM — O43.193 ACCESSORY PLACENTA, THIRD TRIMESTER: Status: ACTIVE | Noted: 2022-12-08

## 2022-12-30 ENCOUNTER — ROUTINE PRENATAL (OUTPATIENT)
Dept: OBGYN CLINIC | Age: 32
End: 2022-12-30
Payer: COMMERCIAL

## 2022-12-30 VITALS — WEIGHT: 183.4 LBS | BODY MASS INDEX: 32.49 KG/M2 | DIASTOLIC BLOOD PRESSURE: 68 MMHG | SYSTOLIC BLOOD PRESSURE: 108 MMHG

## 2022-12-30 DIAGNOSIS — Z3A.32 32 WEEKS GESTATION OF PREGNANCY: ICD-10-CM

## 2022-12-30 DIAGNOSIS — G40.909 EPILEPSY AFFECTING PREGNANCY IN THIRD TRIMESTER (HCC): ICD-10-CM

## 2022-12-30 DIAGNOSIS — Z34.83 PRENATAL CARE, SUBSEQUENT PREGNANCY IN THIRD TRIMESTER: Primary | ICD-10-CM

## 2022-12-30 DIAGNOSIS — A37.10: ICD-10-CM

## 2022-12-30 DIAGNOSIS — Z13.89 SCREENING FOR GENITOURINARY CONDITION: ICD-10-CM

## 2022-12-30 DIAGNOSIS — O99.213 OBESITY AFFECTING PREGNANCY IN THIRD TRIMESTER: ICD-10-CM

## 2022-12-30 DIAGNOSIS — O99.353 EPILEPSY AFFECTING PREGNANCY IN THIRD TRIMESTER (HCC): ICD-10-CM

## 2022-12-30 DIAGNOSIS — O09.93 HIGH-RISK PREGNANCY IN THIRD TRIMESTER: ICD-10-CM

## 2022-12-30 DIAGNOSIS — O43.193 ACCESSORY PLACENTA, THIRD TRIMESTER: ICD-10-CM

## 2022-12-30 LAB
GLUCOSE URINE, POC: NEGATIVE
PROTEIN,URINE, POC: NEGATIVE

## 2022-12-30 PROCEDURE — 99902 PR PRENATAL VISIT: CPT | Performed by: STUDENT IN AN ORGANIZED HEALTH CARE EDUCATION/TRAINING PROGRAM

## 2022-12-30 PROCEDURE — 90471 IMMUNIZATION ADMIN: CPT | Performed by: STUDENT IN AN ORGANIZED HEALTH CARE EDUCATION/TRAINING PROGRAM

## 2022-12-30 PROCEDURE — 90715 TDAP VACCINE 7 YRS/> IM: CPT | Performed by: STUDENT IN AN ORGANIZED HEALTH CARE EDUCATION/TRAINING PROGRAM

## 2022-12-30 NOTE — PROGRESS NOTES
34 y.o.  at [place] [address]  who is here for routine OB visit. Pt reporting discomfort to right side of abdomen on 2022. Pt stated it resolved towards the end of the day with rest and hydration. Reassurance provided. Flu shot undecided. Tdap desires, will give today. Neurology consult, will send today. Lamictal level next week, pt reports she has been non-compliant with medication for the past few days. Stressed importance of compliance with seizure medication. HISTORY:  OB History    Para Term  AB Living   3 1 1   1 1   SAB IAB Ectopic Molar Multiple Live Births   1         1      # Outcome Date GA Lbr Ousmane/2nd Weight Sex Delivery Anes PTL Lv   3 Current            2 Term 20 39w0d  7 lb (3.175 kg) F Vag-Spont EPI  MOHAMUD      Birth Comments: Induced   1 SAB 2019 8w0d             Patient's last menstrual period was 2022 (exact date). Social History     Substance and Sexual Activity   Sexual Activity Yes    Partners: Male      Past Medical History:   Diagnosis Date    Attention deficit hyperactivity disorder     Attention deficit hyperactivity disorder     Epilepsy ([facility] [address]     last grand mal seizure 2017    Low-lying placenta in third trimester 10/4/2022    10/04/22 at [facility]: likely to resolve, bleeding precautions. 22 [facility]: Resolved      Past Surgical History:   Procedure Laterality Date    KNEE ARTHROSCOPY         ROS:  Feeling well. No dyspnea or chest pain on exertion. No abdominal pain, change in bowel habits, black or bloody stools. No urinary tract symptoms. OB ROS: No vaginal bleeding, cxns, LOF, decreased FM. No HA, vision changes, abdominal pain. PHYSICAL EXAM:  /68   Wt 183 lb 6.4 oz (83.2 kg)   LMP 2022 (Exact Date)   BMI 32.49 kg/m²        ASSESSMENT / PLAN:  1. Prenatal care, subsequent pregnancy in third trimester  2. 32 weeks gestation of pregnancy  3.  Epilepsy affecting pregnancy in third trimester [facility])  Overview:  Hx of Epilepsy 2004  Stable on Lamictal 200mg-sees Neuro in [place] seen in July 2020. Last seizure 5+ years ago(Grand Mal) lamictal level every trimester. 8/18/22 [facility]: lamictal level pending, however pt had not taken her medication that day prior to draw. 8/17/22-1.0-patient has not been compliant on taking medication. 9/12/22 2.8, therapeutic   10/04/22 at [facility]: Lamictal 200 mg BID, recheck early third trimester. Anatomy reassuring. 12/07/22 [facility]: Estefania Xavier Lamictal 200mg BID. Rec establishing with local neurology. Redraw Lamictal levels in 3rd trimester  12/30/22: neurology consult sent, will draw lamictal level next week (not today as pt has been non-compliant with taking medication for the past few days). Orders:  -     Lamotrigine Level; Future  -     [facility] DO Anaya, Neurology, [facility]  4. Accessory placenta, third trimester  5. High-risk pregnancy in third trimester  Overview:  NIPT-low risk  Flu- undecided  Tdap- given 12/30/22  Scheduled to see MFM on 8/18 for NT. Transfer in @ 12w0d from [facility] in [place], [facility]  08/18/22 at [facility]: Normal NT and nasal bone. Genetic counseling done by MD. NIPT low risk  10/04/22 at [facility]: Normal anatomy and echo; AC 80%, CAESAR WNL, Dopplers WNL  12/07/22 [facility]: Appropriate fetal growth; Normal repeat echo; AC  32%, Overall 46%, CAESAR 17 cm, Dopplers WNL, BPP 8/8 12/2--per MFM 3 hr not needed. No F/U MFM-refer back as needed      6. Obesity affecting pregnancy in third trimester  7. Screening for genitourinary condition  -     AMB POC OB URINE DIP  8.  Whooping cough due to Bordetella parapertussis  -     Tdap, BOOSTRIX, (age 8 yrs+), ADITHYA Phelps MD

## 2023-01-04 ENCOUNTER — NURSE ONLY (OUTPATIENT)
Dept: OBGYN CLINIC | Age: 33
End: 2023-01-04

## 2023-01-04 DIAGNOSIS — O99.353 EPILEPSY AFFECTING PREGNANCY IN THIRD TRIMESTER (HCC): ICD-10-CM

## 2023-01-04 DIAGNOSIS — G40.909 EPILEPSY AFFECTING PREGNANCY IN THIRD TRIMESTER (HCC): ICD-10-CM

## 2023-01-27 ENCOUNTER — ROUTINE PRENATAL (OUTPATIENT)
Dept: OBGYN CLINIC | Age: 33
End: 2023-01-27

## 2023-01-27 VITALS — BODY MASS INDEX: 33.16 KG/M2 | WEIGHT: 187.2 LBS | DIASTOLIC BLOOD PRESSURE: 68 MMHG | SYSTOLIC BLOOD PRESSURE: 114 MMHG

## 2023-01-27 DIAGNOSIS — Z3A.36 36 WEEKS GESTATION OF PREGNANCY: ICD-10-CM

## 2023-01-27 DIAGNOSIS — O99.213 OBESITY AFFECTING PREGNANCY IN THIRD TRIMESTER: ICD-10-CM

## 2023-01-27 DIAGNOSIS — O99.353 EPILEPSY AFFECTING PREGNANCY IN THIRD TRIMESTER (HCC): ICD-10-CM

## 2023-01-27 DIAGNOSIS — Z36.85 ANTENATAL SCREENING FOR STREPTOCOCCUS B: ICD-10-CM

## 2023-01-27 DIAGNOSIS — G40.909 EPILEPSY AFFECTING PREGNANCY IN THIRD TRIMESTER (HCC): ICD-10-CM

## 2023-01-27 DIAGNOSIS — O09.93 HIGH-RISK PREGNANCY IN THIRD TRIMESTER: Primary | ICD-10-CM

## 2023-01-27 DIAGNOSIS — Z13.89 SCREENING FOR GENITOURINARY CONDITION: ICD-10-CM

## 2023-01-27 LAB
GLUCOSE URINE, POC: NEGATIVE
PROTEIN,URINE, POC: NEGATIVE

## 2023-01-27 PROCEDURE — 99902 PR PRENATAL VISIT: CPT | Performed by: OBSTETRICS & GYNECOLOGY

## 2023-01-27 NOTE — PROGRESS NOTES
Kick counts and ptl  /labor precautions. gbs today. Taking lamictal.  Pt anxious about accessory placental lobe. Last growth 12/22. Has gained 44lbs. Will repeat growth and attempt to look at placenta next week.

## 2023-01-29 LAB
BACTERIA SPEC CULT: NORMAL
SERVICE CMNT-IMP: NORMAL

## 2023-02-01 ENCOUNTER — ROUTINE PRENATAL (OUTPATIENT)
Dept: OBGYN CLINIC | Age: 33
End: 2023-02-01
Payer: COMMERCIAL

## 2023-02-01 VITALS — BODY MASS INDEX: 33.83 KG/M2 | DIASTOLIC BLOOD PRESSURE: 70 MMHG | SYSTOLIC BLOOD PRESSURE: 120 MMHG | WEIGHT: 191 LBS

## 2023-02-01 DIAGNOSIS — Z3A.37 37 WEEKS GESTATION OF PREGNANCY: ICD-10-CM

## 2023-02-01 DIAGNOSIS — Z13.89 SCREENING FOR GENITOURINARY CONDITION: ICD-10-CM

## 2023-02-01 DIAGNOSIS — G40.909 EPILEPSY AFFECTING PREGNANCY IN THIRD TRIMESTER (HCC): ICD-10-CM

## 2023-02-01 DIAGNOSIS — O99.213 OBESITY AFFECTING PREGNANCY IN THIRD TRIMESTER: Primary | ICD-10-CM

## 2023-02-01 DIAGNOSIS — O09.93 HIGH-RISK PREGNANCY IN THIRD TRIMESTER: ICD-10-CM

## 2023-02-01 DIAGNOSIS — O99.353 EPILEPSY AFFECTING PREGNANCY IN THIRD TRIMESTER (HCC): ICD-10-CM

## 2023-02-01 LAB
GLUCOSE URINE, POC: NEGATIVE
PROTEIN,URINE, POC: NEGATIVE

## 2023-02-01 PROCEDURE — 99902 PR PRENATAL VISIT: CPT | Performed by: OBSTETRICS & GYNECOLOGY

## 2023-02-01 PROCEDURE — 76816 OB US FOLLOW-UP PER FETUS: CPT | Performed by: OBSTETRICS & GYNECOLOGY

## 2023-02-01 NOTE — PROGRESS NOTES
Appropriate growth, 6#10oz AC 53%. Traumatic delivery experience during first labor, considering . Counseled regarding implications, considerations. Will check SVE next week and discuss further.

## 2023-02-08 ENCOUNTER — ROUTINE PRENATAL (OUTPATIENT)
Dept: OBGYN CLINIC | Age: 33
End: 2023-02-08

## 2023-02-08 VITALS — BODY MASS INDEX: 33.52 KG/M2 | WEIGHT: 189.2 LBS | SYSTOLIC BLOOD PRESSURE: 122 MMHG | DIASTOLIC BLOOD PRESSURE: 70 MMHG

## 2023-02-08 DIAGNOSIS — O43.193 ACCESSORY PLACENTA, THIRD TRIMESTER: ICD-10-CM

## 2023-02-08 DIAGNOSIS — O99.213 OBESITY AFFECTING PREGNANCY IN THIRD TRIMESTER: ICD-10-CM

## 2023-02-08 DIAGNOSIS — Z13.89 SCREENING FOR GENITOURINARY CONDITION: ICD-10-CM

## 2023-02-08 DIAGNOSIS — G40.909 EPILEPSY AFFECTING PREGNANCY IN THIRD TRIMESTER (HCC): ICD-10-CM

## 2023-02-08 DIAGNOSIS — O99.353 EPILEPSY AFFECTING PREGNANCY IN THIRD TRIMESTER (HCC): ICD-10-CM

## 2023-02-08 DIAGNOSIS — Z3A.38 38 WEEKS GESTATION OF PREGNANCY: ICD-10-CM

## 2023-02-08 DIAGNOSIS — O09.93 HIGH-RISK PREGNANCY IN THIRD TRIMESTER: Primary | ICD-10-CM

## 2023-02-08 LAB
GLUCOSE URINE, POC: NEGATIVE
PROTEIN,URINE, POC: NEGATIVE

## 2023-02-15 ENCOUNTER — ANESTHESIA EVENT (OUTPATIENT)
Dept: LABOR AND DELIVERY | Age: 33
End: 2023-02-15
Payer: COMMERCIAL

## 2023-02-15 ENCOUNTER — HOSPITAL ENCOUNTER (INPATIENT)
Age: 33
LOS: 2 days | Discharge: HOME | End: 2023-02-17
Attending: STUDENT IN AN ORGANIZED HEALTH CARE EDUCATION/TRAINING PROGRAM | Admitting: STUDENT IN AN ORGANIZED HEALTH CARE EDUCATION/TRAINING PROGRAM
Payer: COMMERCIAL

## 2023-02-15 ENCOUNTER — ANESTHESIA (OUTPATIENT)
Dept: LABOR AND DELIVERY | Age: 33
End: 2023-02-15
Payer: COMMERCIAL

## 2023-02-15 PROBLEM — Z37.9 NORMAL LABOR: Status: ACTIVE | Noted: 2023-02-15

## 2023-02-15 LAB
ABO + RH BLD: NORMAL
BASE DEFICIT BLD-SCNC: 3.3 MMOL/L
BASOPHILS # BLD: 0.1 K/UL (ref 0–0.2)
BASOPHILS NFR BLD: 1 % (ref 0–2)
BLOOD GROUP ANTIBODIES SERPL: NORMAL
DIFFERENTIAL METHOD BLD: ABNORMAL
EOSINOPHIL # BLD: 0.4 K/UL (ref 0–0.8)
EOSINOPHIL NFR BLD: 4 % (ref 0.5–7.8)
ERYTHROCYTE [DISTWIDTH] IN BLOOD BY AUTOMATED COUNT: 12.6 % (ref 11.9–14.6)
HCO3 BLDV-SCNC: 21.6 MMOL/L (ref 23–28)
HCT VFR BLD AUTO: 36.7 % (ref 35.8–46.3)
HGB BLD-MCNC: 12.9 G/DL (ref 11.7–15.4)
IMM GRANULOCYTES # BLD AUTO: 0.2 K/UL (ref 0–0.5)
IMM GRANULOCYTES NFR BLD AUTO: 2 % (ref 0–5)
LYMPHOCYTES # BLD: 1.6 K/UL (ref 0.5–4.6)
LYMPHOCYTES NFR BLD: 17 % (ref 13–44)
MCH RBC QN AUTO: 30.9 PG (ref 26.1–32.9)
MCHC RBC AUTO-ENTMCNC: 35.1 G/DL (ref 31.4–35)
MCV RBC AUTO: 88 FL (ref 82–102)
MONOCYTES # BLD: 0.6 K/UL (ref 0.1–1.3)
MONOCYTES NFR BLD: 6 % (ref 4–12)
NEUTS SEG # BLD: 6.5 K/UL (ref 1.7–8.2)
NEUTS SEG NFR BLD: 70 % (ref 43–78)
NRBC # BLD: 0 K/UL (ref 0–0.2)
PCO2 BLDCO: 38 MMHG (ref 32–68)
PH BLDCO: 7.37 (ref 7.15–7.38)
PLATELET # BLD AUTO: 199 K/UL (ref 150–450)
PMV BLD AUTO: 10.6 FL (ref 9.4–12.3)
PO2 BLDCO: 35 MMHG
RBC # BLD AUTO: 4.17 M/UL (ref 4.05–5.2)
SAO2 % BLDV: 66.3 % (ref 65–88)
SERVICE CMNT-IMP: ABNORMAL
SPECIMEN EXP DATE BLD: NORMAL
SPECIMEN TYPE: ABNORMAL
WBC # BLD AUTO: 9.3 K/UL (ref 4.3–11.1)

## 2023-02-15 PROCEDURE — 51701 INSERT BLADDER CATHETER: CPT

## 2023-02-15 PROCEDURE — 86850 RBC ANTIBODY SCREEN: CPT

## 2023-02-15 PROCEDURE — 6360000002 HC RX W HCPCS: Performed by: STUDENT IN AN ORGANIZED HEALTH CARE EDUCATION/TRAINING PROGRAM

## 2023-02-15 PROCEDURE — 0KQM0ZZ REPAIR PERINEUM MUSCLE, OPEN APPROACH: ICD-10-PCS | Performed by: STUDENT IN AN ORGANIZED HEALTH CARE EDUCATION/TRAINING PROGRAM

## 2023-02-15 PROCEDURE — 36600 WITHDRAWAL OF ARTERIAL BLOOD: CPT

## 2023-02-15 PROCEDURE — 6370000000 HC RX 637 (ALT 250 FOR IP): Performed by: STUDENT IN AN ORGANIZED HEALTH CARE EDUCATION/TRAINING PROGRAM

## 2023-02-15 PROCEDURE — 6360000002 HC RX W HCPCS: Performed by: ANESTHESIOLOGY

## 2023-02-15 PROCEDURE — 7220000101 HC DELIVERY VAGINAL/SINGLE

## 2023-02-15 PROCEDURE — 7100000011 HC PHASE II RECOVERY - ADDTL 15 MIN

## 2023-02-15 PROCEDURE — 82803 BLOOD GASES ANY COMBINATION: CPT

## 2023-02-15 PROCEDURE — 6360000002 HC RX W HCPCS: Performed by: NURSE ANESTHETIST, CERTIFIED REGISTERED

## 2023-02-15 PROCEDURE — 7210000100 HC LABOR FEE PER 1 HR

## 2023-02-15 PROCEDURE — 7100000010 HC PHASE II RECOVERY - FIRST 15 MIN

## 2023-02-15 PROCEDURE — 3E0DXGC INTRODUCTION OF OTHER THERAPEUTIC SUBSTANCE INTO MOUTH AND PHARYNX, EXTERNAL APPROACH: ICD-10-PCS | Performed by: STUDENT IN AN ORGANIZED HEALTH CARE EDUCATION/TRAINING PROGRAM

## 2023-02-15 PROCEDURE — 85025 COMPLETE CBC W/AUTO DIFF WBC: CPT

## 2023-02-15 PROCEDURE — 59400 OBSTETRICAL CARE: CPT | Performed by: STUDENT IN AN ORGANIZED HEALTH CARE EDUCATION/TRAINING PROGRAM

## 2023-02-15 PROCEDURE — 10907ZC DRAINAGE OF AMNIOTIC FLUID, THERAPEUTIC FROM PRODUCTS OF CONCEPTION, VIA NATURAL OR ARTIFICIAL OPENING: ICD-10-PCS | Performed by: STUDENT IN AN ORGANIZED HEALTH CARE EDUCATION/TRAINING PROGRAM

## 2023-02-15 PROCEDURE — 00HU33Z INSERTION OF INFUSION DEVICE INTO SPINAL CANAL, PERCUTANEOUS APPROACH: ICD-10-PCS | Performed by: ANESTHESIOLOGY

## 2023-02-15 PROCEDURE — 99465 NB RESUSCITATION: CPT

## 2023-02-15 PROCEDURE — 3700000025 EPIDURAL BLOCK: Performed by: ANESTHESIOLOGY

## 2023-02-15 PROCEDURE — 3E033VJ INTRODUCTION OF OTHER HORMONE INTO PERIPHERAL VEIN, PERCUTANEOUS APPROACH: ICD-10-PCS | Performed by: STUDENT IN AN ORGANIZED HEALTH CARE EDUCATION/TRAINING PROGRAM

## 2023-02-15 PROCEDURE — 2580000003 HC RX 258: Performed by: STUDENT IN AN ORGANIZED HEALTH CARE EDUCATION/TRAINING PROGRAM

## 2023-02-15 PROCEDURE — 1100000000 HC RM PRIVATE

## 2023-02-15 RX ORDER — SODIUM CHLORIDE 0.9 % (FLUSH) 0.9 %
5-40 SYRINGE (ML) INJECTION EVERY 12 HOURS SCHEDULED
Status: DISCONTINUED | OUTPATIENT
Start: 2023-02-15 | End: 2023-02-15

## 2023-02-15 RX ORDER — OXYCODONE HYDROCHLORIDE 5 MG/1
5 TABLET ORAL EVERY 4 HOURS PRN
Status: DISCONTINUED | OUTPATIENT
Start: 2023-02-15 | End: 2023-02-17 | Stop reason: HOSPADM

## 2023-02-15 RX ORDER — ROPIVACAINE HYDROCHLORIDE 2 MG/ML
INJECTION, SOLUTION EPIDURAL; INFILTRATION; PERINEURAL PRN
Status: DISCONTINUED | OUTPATIENT
Start: 2023-02-15 | End: 2023-02-15

## 2023-02-15 RX ORDER — LAMOTRIGINE 100 MG/1
200 TABLET ORAL 2 TIMES DAILY
Status: DISCONTINUED | OUTPATIENT
Start: 2023-02-15 | End: 2023-02-17 | Stop reason: HOSPADM

## 2023-02-15 RX ORDER — SODIUM CHLORIDE 0.9 % (FLUSH) 0.9 %
5-40 SYRINGE (ML) INJECTION PRN
Status: DISCONTINUED | OUTPATIENT
Start: 2023-02-15 | End: 2023-02-16

## 2023-02-15 RX ORDER — POLYETHYLENE GLYCOL 3350 17 G/17G
17 POWDER, FOR SOLUTION ORAL DAILY
Status: DISCONTINUED | OUTPATIENT
Start: 2023-02-15 | End: 2023-02-17 | Stop reason: HOSPADM

## 2023-02-15 RX ORDER — ONDANSETRON 8 MG/1
8 TABLET, ORALLY DISINTEGRATING ORAL EVERY 8 HOURS PRN
Status: DISCONTINUED | OUTPATIENT
Start: 2023-02-15 | End: 2023-02-17 | Stop reason: HOSPADM

## 2023-02-15 RX ORDER — LANOLIN
CREAM (ML) TOPICAL PRN
Status: DISCONTINUED | OUTPATIENT
Start: 2023-02-15 | End: 2023-02-17 | Stop reason: HOSPADM

## 2023-02-15 RX ORDER — DOCUSATE SODIUM 100 MG/1
100 CAPSULE, LIQUID FILLED ORAL 2 TIMES DAILY
Status: DISCONTINUED | OUTPATIENT
Start: 2023-02-15 | End: 2023-02-17 | Stop reason: HOSPADM

## 2023-02-15 RX ORDER — ROPIVACAINE HYDROCHLORIDE 5 MG/ML
INJECTION, SOLUTION EPIDURAL; INFILTRATION; PERINEURAL PRN
Status: DISCONTINUED | OUTPATIENT
Start: 2023-02-15 | End: 2023-02-15 | Stop reason: SDUPTHER

## 2023-02-15 RX ORDER — ACETAMINOPHEN 500 MG
1000 TABLET ORAL EVERY 8 HOURS
Status: DISCONTINUED | OUTPATIENT
Start: 2023-02-15 | End: 2023-02-17 | Stop reason: HOSPADM

## 2023-02-15 RX ORDER — SODIUM CHLORIDE, SODIUM LACTATE, POTASSIUM CHLORIDE, AND CALCIUM CHLORIDE .6; .31; .03; .02 G/100ML; G/100ML; G/100ML; G/100ML
500 INJECTION, SOLUTION INTRAVENOUS PRN
Status: DISCONTINUED | OUTPATIENT
Start: 2023-02-15 | End: 2023-02-15

## 2023-02-15 RX ORDER — ROPIVACAINE HYDROCHLORIDE 2 MG/ML
INJECTION, SOLUTION EPIDURAL; INFILTRATION; PERINEURAL CONTINUOUS PRN
Status: DISCONTINUED | OUTPATIENT
Start: 2023-02-15 | End: 2023-02-15 | Stop reason: SDUPTHER

## 2023-02-15 RX ORDER — SODIUM CHLORIDE 9 MG/ML
INJECTION, SOLUTION INTRAVENOUS PRN
Status: DISCONTINUED | OUTPATIENT
Start: 2023-02-15 | End: 2023-02-16

## 2023-02-15 RX ORDER — ONDANSETRON 2 MG/ML
4 INJECTION INTRAMUSCULAR; INTRAVENOUS EVERY 6 HOURS PRN
Status: DISCONTINUED | OUTPATIENT
Start: 2023-02-15 | End: 2023-02-15

## 2023-02-15 RX ORDER — SODIUM CHLORIDE 9 MG/ML
25 INJECTION, SOLUTION INTRAVENOUS PRN
Status: DISCONTINUED | OUTPATIENT
Start: 2023-02-15 | End: 2023-02-15

## 2023-02-15 RX ORDER — SODIUM CHLORIDE, SODIUM LACTATE, POTASSIUM CHLORIDE, CALCIUM CHLORIDE 600; 310; 30; 20 MG/100ML; MG/100ML; MG/100ML; MG/100ML
INJECTION, SOLUTION INTRAVENOUS CONTINUOUS
Status: DISCONTINUED | OUTPATIENT
Start: 2023-02-15 | End: 2023-02-16

## 2023-02-15 RX ORDER — SODIUM CHLORIDE, SODIUM LACTATE, POTASSIUM CHLORIDE, AND CALCIUM CHLORIDE .6; .31; .03; .02 G/100ML; G/100ML; G/100ML; G/100ML
1000 INJECTION, SOLUTION INTRAVENOUS PRN
Status: DISCONTINUED | OUTPATIENT
Start: 2023-02-15 | End: 2023-02-15

## 2023-02-15 RX ORDER — HYDROCORTISONE ACETATE PRAMOXINE HCL 2.5; 1 G/100G; G/100G
CREAM TOPICAL
Status: DISCONTINUED | OUTPATIENT
Start: 2023-02-15 | End: 2023-02-17 | Stop reason: HOSPADM

## 2023-02-15 RX ORDER — SODIUM CHLORIDE 0.9 % (FLUSH) 0.9 %
5-40 SYRINGE (ML) INJECTION PRN
Status: DISCONTINUED | OUTPATIENT
Start: 2023-02-15 | End: 2023-02-15

## 2023-02-15 RX ORDER — IBUPROFEN 600 MG/1
600 TABLET ORAL EVERY 8 HOURS SCHEDULED
Status: DISCONTINUED | OUTPATIENT
Start: 2023-02-15 | End: 2023-02-17 | Stop reason: HOSPADM

## 2023-02-15 RX ORDER — SODIUM CHLORIDE 0.9 % (FLUSH) 0.9 %
5-40 SYRINGE (ML) INJECTION EVERY 12 HOURS SCHEDULED
Status: DISCONTINUED | OUTPATIENT
Start: 2023-02-15 | End: 2023-02-16

## 2023-02-15 RX ORDER — SODIUM CHLORIDE, SODIUM LACTATE, POTASSIUM CHLORIDE, CALCIUM CHLORIDE 600; 310; 30; 20 MG/100ML; MG/100ML; MG/100ML; MG/100ML
INJECTION, SOLUTION INTRAVENOUS CONTINUOUS
Status: DISCONTINUED | OUTPATIENT
Start: 2023-02-15 | End: 2023-02-15

## 2023-02-15 RX ADMIN — Medication 2 MILLI-UNITS/MIN: at 08:00

## 2023-02-15 RX ADMIN — ROPIVACAINE HYDROCHLORIDE 8 ML/HR: 2 INJECTION, SOLUTION EPIDURAL; INFILTRATION; PERINEURAL at 11:17

## 2023-02-15 RX ADMIN — Medication: at 19:28

## 2023-02-15 RX ADMIN — OXYCODONE 5 MG: 5 TABLET ORAL at 23:28

## 2023-02-15 RX ADMIN — ACETAMINOPHEN 1000 MG: 500 TABLET, FILM COATED ORAL at 19:27

## 2023-02-15 RX ADMIN — SODIUM CHLORIDE, POTASSIUM CHLORIDE, SODIUM LACTATE AND CALCIUM CHLORIDE: 600; 310; 30; 20 INJECTION, SOLUTION INTRAVENOUS at 07:35

## 2023-02-15 RX ADMIN — ROPIVACAINE HYDROCHLORIDE 10 ML: 5 INJECTION, SOLUTION EPIDURAL; INFILTRATION; PERINEURAL at 11:15

## 2023-02-15 RX ADMIN — Medication 166.7 ML: at 16:08

## 2023-02-15 RX ADMIN — WITCH HAZEL: 500 SOLUTION RECTAL; TOPICAL at 19:27

## 2023-02-15 RX ADMIN — DOCUSATE SODIUM 100 MG: 100 CAPSULE ORAL at 20:07

## 2023-02-15 RX ADMIN — IBUPROFEN 600 MG: 600 TABLET, FILM COATED ORAL at 23:28

## 2023-02-15 NOTE — PROGRESS NOTES
Assisted pt to bathroom. Unable to void. Pad changed and dev care done. Ice pack to perineum. Pt denies complaints.

## 2023-02-15 NOTE — PROGRESS NOTES
Update Note: To pt's room for recurrent variable decelerations. SVE: 9/90/-1, ROP position    FHTs: variable decels with contractions, moderate variability.      Position changes  Pitocin turned off  Continue to monitor for now      Agustina Zambrano MD  [facility]

## 2023-02-15 NOTE — L&D DELIVERY NOTE
Mother's Information      Labor Events     Labor?: No  Cervical Ripening:   Now               Giovani Alas [[id number]]      Labor Events     Labor?: No   Steroids?: None  Cervical Ripening Date/Time:     Antibiotics Received during Labor?: No  Rupture Date/Time: 2/15/23 08:30:00   Rupture Type: AROM, Intact  Fluid Color: Clear  Fluid Odor: None  Fluid Volume:  Moderate  Induction: Misoprostol, Oxytocin, AROM  Labor Complications: Shoulder Dystocia       Anesthesia    Method: Epidural       Start Pushing      Labor onset date/time: 2/15/23 08:00:00 Now     Dilation complete date/time: 2/15/23 15:00:00 EST Now     Start pushing date/time: 2/15/2023 15:10:00   Decision date/time (emergent ):           Delivery ()      Delivery Date/Time:  2/15/23 16:03:00   Delivery Type: Vaginal, Spontaneous    Details:             Presentation    Presentation: Vertex  Position: Right  _: Occiput  _: Anterior       Shoulder Dystocia    Shoulder Dystocia Present?: Yes  Anterior Shoulder: Left    Time Recognized: 2/15/2023 16:02:20    Time Help Called: 2/15/2023 16:02:20   Physician/Provider Arrived: 2/15/2023 16:02:20    Team Arrived: 2/15/2023 16:03:00   Gentle Attempt at Traction, Assisted by Maternal Expulsive Forces?: Yes  Add Second Maneuver  Add Third Maneuver  Add Fourth Maneuver  Add Fifth Maneuver  Add Sixth Maneuver  Add Seventh Maneuver  Add Eighth Maneuver  Add Ninth Maneuver       Assisted Delivery Details    Forceps Attempted?: No  Vacuum Extractor Attempted?: No       Document Additional Attempt         Document Additional Attempt                 Cord    Vessels: 3 Vessels  Complications: Nuchal Tight  Cord Around: Head  Delayed Cord Clamping?: No  Cord Blood Disposition: Lab  Gases Sent?: Yes       Placenta    Date/Time: 2/15/2023 16:08:00  Removal: Spontaneous  Appearance: Intact  Disposition: Discarded       Lacerations    Episiotomy: None  Perineal Lacerations: 2nd  Other Lacerations: no non-perineal laceration  Number of Repair Packets: 1       Vaginal Counts    Initial Count Personnel: [facility]  Initial Count Verified By: Ivett TERRY RN    Sponges [place] [employer]   Initial Counts Correct Correct Correct   Final Counts Correct Correct Correct   Final Count Personnel: [facility]  Final Count Verified By: Ivett TERRY RN  Accurate Final Count?: Yes  If the count is incorrect due to Intentionally Retained Foreign Object (IRFO) add the IRFO LDA in Lines/Drains. Add LDA: Link to [facility]       Blood Loss  Mother: Benjie Field #[id number]     Start of Mother's Information      Delivery Blood Loss  02/15/23 0800 - 02/15/23 1644      None                 End of Mother's Information  Mother: Benjie Field #[id number]                Delivery Providers    Delivering clinician: Cate Collazo MD     Provider Role    MD Rossy Braun RN Primary Nurse    Adelaide Maynard RN Primary  Nurse    Lico Herron, DANII Charge Nurse    Syl[facility]    Leslee Castillo MD Neonatologist    Satish Beckett, RCP Respiratory Therapist (Day)               Assessment    Living Status: Living     Apgar Scoring Key:    0 1 2    Skin Color: Blue or pale Acrocyanotic Completely pink    Heart Rate: Absent <100 bpm >100 bpm    Reflex Irritability: No response Grimace Cry or active withdrawal    Muscle Tone: Limp Some flexion Active motion    Respiratory Effort: Absent Weak cry; hypoventilation Good, crying                      Skin Color:   Heart Rate:   Reflex Irritability:   Muscle Tone:   Respiratory Effort:    Total:            1 Minute:    0    2    1    0    0    3        Apgar 1 total from OB History    5 Minute:    1    2    2    2    2    9        Apgar 5 total from OB History    10 Minute:              15 Minute:              20 Minute:                        Apgars Assigned By: DR Daquan Gaviria Resuscitation    Method: Bulb Suction, PPV < 1 minute, Stimulation             [place] Measurements      Birth Weight: 3360 g Birth Length: 0.52 m     Head Circumference: 0.35 m Chest Circumference: 0.325 m              Title      Skin to Skin Initiation Date/Time:       Skin to Skin End Date/Time:

## 2023-02-15 NOTE — H&P
History & Physical    Name: Ludin Serrano MRN: [id number]  SSN: [id number]    YOB: 1990  Age: 34 y.o. Sex: female      Subjective:     Estimated Date of Delivery: 23  OB History    Para Term  AB Living   3 1 1   1 1   SAB IAB Ectopic Molar Multiple Live Births   1         1      # Outcome Date GA Lbr Ousmane/2nd Weight Sex Delivery Anes PTL Lv   3 Current            2 Term 20 39w0d  7 lb (3.175 kg) F Vag-Spont EPI  MOHAMUD      Birth Comments: Induced   1 SAB 2019 8w0d              Ms. Pratima Sher is admitted with pregnancy at 39w0d for induction of labor due to favorable cervix at term. Prenatal course was complicated by  epilepsy, obesity, accessory lobe of placenta . Please see prenatal records for details. Past Medical History:   Diagnosis Date    Attention deficit hyperactivity disorder     Attention deficit hyperactivity disorder     Epilepsy ([address]     last grand mal seizure 2017    Low-lying placenta in third trimester 10/4/2022    10/04/22 at [facility]: likely to resolve, bleeding precautions. 22 [facility]: Resolved     Past Surgical History:   Procedure Laterality Date    KNEE ARTHROSCOPY       Social History     Occupational History    Not on file   Tobacco Use    Smoking status: Never    Smokeless tobacco: Never   Vaping Use    Vaping Use: Never used   Substance and Sexual Activity    Alcohol use: Not Currently    Drug use: Never    Sexual activity: Yes     Partners: Male     Family History   Problem Relation Age of Onset    Heart Attack Paternal Grandfather     Heart Attack Paternal Grandmother        Allergies   Allergen Reactions    Apple Anaphylaxis     Prior to Admission medications    Medication Sig Start Date End Date Taking?  Authorizing Provider   calcium carbonate (OSCAL) 500 MG TABS tablet Take 500 mg by mouth daily    Historical Provider, MD   ferrous sulfate (IRON 325) 325 (65 Fe) MG tablet Take 325 mg by mouth daily (with breakfast)    Historical Provider, MD   folic acid (FOLVITE) 116 MCG tablet Take 400 mcg by mouth in the morning. Historical Provider, MD   lamoTRIgine (LAMICTAL) 200 MG tablet Take 200 mg by mouth 2 times daily    Historical Provider, MD   Prenat-Fe Carbonyl-FA-Omega 3 (ONE-A-DAY WOMENS PRENATAL 1 PO) Take by mouth    Historical Provider, MD        Review of Systems:   Pertinent ROS noted in the HPI. Objective:     Vitals: There were no vitals filed for this visit. Physical Exam:  Constitutional: no distress  Heart: RRR  Lungs: CTAB  Abdomen: soft, nontender  LE: tr swelling  SVE: 2-3/50/-2   Membranes:  Artificial Rupture of Membranes; Amniotic Fluid: small amount of clear fluid at 0830  Fetal Heart Rate: Reactive          Prenatal Labs:   No results found for: ABORH, RUBELLAEXT, HBSAGEXT, HIVEXT, RPREXT, GONNOEXT, CHLAMEXT    Impression/Plan:     Principal Problem:    Normal labor  Resolved Problems:    * No resolved hospital problems. *       Plan: Admit for induction of labor. Group B Strep negative. Continue Pitocin.       Mariela Pham MD  [facility]

## 2023-02-15 NOTE — ANESTHESIA PROCEDURE NOTES
Epidural Block    Patient location during procedure: OB  Start time: 2/15/2023 11:10 AM  End time: 2/15/2023 11:16 AM  Reason for block: labor epidural  Staffing  Performed: anesthesiologist   Anesthesiologist: Rojelio Moise MD  Epidural  Patient position: sitting  Prep: ChloraPrep  Patient monitoring: continuous pulse ox and frequent blood pressure checks  Approach: midline  Location: L3-4  Injection technique: CHINTAN saline  Provider prep: mask and sterile gloves  Needle  Needle type: Tuohy   Needle gauge: 17 G  Needle length: 3.5 in  Needle insertion depth: 4.5 cm  Catheter type: end hole  Catheter size: 19 G  Catheter at skin depth: 10 cm  Test dose: negativeCatheter Secured: tegaderm  Assessment  Hemodynamics: stable  Attempts: 1  Outcomes: uncomplicated  Preanesthetic Checklist  Completed: patient identified, IV checked, site marked, risks and benefits discussed, surgical/procedural consents, equipment checked, timeout performed, anesthesia consent given, oxygen available and monitors applied/VS acknowledged

## 2023-02-15 NOTE — OP NOTE
Delivery Note    Obstetrician:  Cate Collazo MD    Assistant: none    Pre-Delivery Diagnosis: Term pregnancy, Induced labor, Single fetus, and Pregnancy complicated by: epilepsy, obesity, accessory lobe of placenta     Post-Delivery Diagnosis: Living  infant(s) and Male    Intrapartum Event: Multiple variable decelerations and shoulder dyscotia. Procedure: Vaginal delivery  After delivery of fetal head, anterior should did not spontaneously deliver. Shoulder dystocia was called and NICU arrived in the room. Pt was already in [place] position. Suprapubic pressure was applied, however anterior shoulder remained impacted. Delivery of the posterior arm was accomplished followed by yadav delivery of the rest of the infant. Shoulder dystocia lasted a total of 50 seconds. Baby was handed off to awaiting NICU team, who provided CPAP. Infant shortly became vigorous with reassuring 5 minute APGAR. Both arms were noted to be moving spontaneously without apparent discomfort. Blood gases were obtained which were reassuring. Epidural: yes    Monitor:  Fetal Heart Tones - External and Uterine Contractions - External    Indications for instrumental delivery: none    Estimated Blood Loss: see QBL    Episiotomy: none    Laceration(s):  2nd degree    Laceration(s) repair: yes with 3-0 Vicryl in the standard fashion    Presentation: Cephalic    Fetal Description: pereira    Fetal Position: Right Occiput Anterior    Birth Weight: 3360g    Apgar - One Minute: 3    Apgar - Five Minutes: 9    Umbilical Cord: Nuchal Cord x  2  Specimens: blood gases  Complications:  shoulder dystocia           Cord Blood Results:   Information for the patient's :  Varinder Monterroso [[id number]]   No results found for: PCTABR, PCTDIG, BILI   Prenatal Labs:   No results found for: PCTABR     Attending Attestation: I was present and scrubbed for the entire procedure.     Mariela Pham MD  [facility]

## 2023-02-15 NOTE — PROGRESS NOTES
1200- SVE 4/90/-2. Pt repositioned to right side on peanut ball. Early decels noted. 12- Dr Betty Null at bedside strip reviewed. SVE no change. I&O cath for 100 cc urine. Pt repositioned to left side on peanut ball. .  1320- Early decels continue. SVE 7/100/-1. Pt repositioned to throne position. Dr Betty Null notified. 1415-  SVE 8-9/100/0. Pt repositioned to right side in [address] [place] [address] roll. Variable decels continue with UC.     1433- Variable decels now dropping down to 60-70  Dr Betty Null at nurses station. Pitocin off.     1437- Dr Betty Null at bedside. SVE per MD.  Pushed x1.    1440- Pt repositioned to left side in side lie and release X3 UC    1450- Pt repositioned to right side in side lie and release X3 UC.     1500-  SVE c/c/+1. Dr Betty Null reviewed strip and orders to start pushing. 1510-Dr Hall at bedside and pt staring to push with MD.  9107- RN continues to remain at bedside pushing with pt. RN continually reviewing FHR. 1556- Pt set up for delivery. Dr Betty Null at bedside. [facility] Head delivered. Shoulder Dystocia recognized. See MD note for maneuvers. 1603 10 sec Infant delivered. Dr Aurora Awad at bedside within one min of delivery. See MAYURI note for details. 1608- Placenta delivered. Pitocin infusing.

## 2023-02-15 NOTE — ANESTHESIA PRE PROCEDURE
Department of Anesthesiology  Preprocedure Note       Name:  Jodi Hermosillo   Age:  34 y.o.  :  1990                                          MRN:  [id number]         Date:  2/15/2023      Surgeon: * No surgeons listed *    Procedure: * No procedures listed *    Medications prior to admission:   Prior to Admission medications    Medication Sig Start Date End Date Taking? Authorizing Provider   calcium carbonate (OSCAL) 500 MG TABS tablet Take 500 mg by mouth daily    Historical Provider, MD   ferrous sulfate (IRON 325) 325 (65 Fe) MG tablet Take 325 mg by mouth daily (with breakfast)    Historical Provider, MD   folic acid (FOLVITE) 665 MCG tablet Take 400 mcg by mouth in the morning.     Historical Provider, MD   lamoTRIgine (LAMICTAL) 200 MG tablet Take 200 mg by mouth 2 times daily    Historical Provider, MD   Prenat-Fe Carbonyl-FA-Omega 3 (ONE-A-DAY WOMENS PRENATAL 1 PO) Take by mouth    Historical Provider, MD       Current medications:    Current Facility-Administered Medications   Medication Dose Route Frequency Provider Last Rate Last Admin    lactated ringers IV soln infusion   IntraVENous Continuous Terence Fulton  mL/hr at 02/15/23 0735 New Bag at 02/15/23 0735    lactated ringers bolus  500 mL IntraVENous PRN Terence Fulton MD        Or   Warden Javier lactated ringers bolus  1,000 mL IntraVENous PRN Terence Fulton MD        sodium chloride flush 0.9 % injection 5-40 mL  5-40 mL IntraVENous 2 times per day Terence Fulton MD        sodium chloride flush 0.9 % injection 5-40 mL  5-40 mL IntraVENous PRN Terence Fulton MD        0.9 % sodium chloride infusion  25 mL IntraVENous PRN Terence Fulton MD        oxytocin (PITOCIN) 30 units in 500 mL infusion  1-20 kwaku-units/min IntraVENous Continuous Terence Fulton MD 10 mL/hr at 02/15/23 0958 10 kwaku-units/min at 02/15/23 0958    ondansetron (ZOFRAN) injection 4 mg  4 mg IntraVENous Q6H PRN Terence Fulton MD         Facility-Administered Medications Ordered in Other Encounters   Medication Dose Route Frequency Provider Last Rate Last Admin    ropivacaine (NAROPIN) 0.5% injection   Epidural PRN Magda Page MD   10 mL at 02/15/23 1115    ropivacaine (NAROPIN) 0.2% injection 0.2%   Epidural PRN Magda Page MD   8 mL at 02/15/23 1117       Allergies: Allergies   Allergen Reactions    Apple Anaphylaxis       Problem List:    Patient Active Problem List   Diagnosis Code    Epilepsy affecting pregnancy in third trimester ([facility] O99.353, G40.909    High-risk pregnancy in third trimester O09.93    Attention deficit hyperactivity disorder F90.9    Obesity affecting pregnancy in third trimester O99.213    Accessory placenta, third trimester O43. 80    Normal labor O80, Z37.9       Past Medical History:        Diagnosis Date    Attention deficit hyperactivity disorder     Attention deficit hyperactivity disorder     Epilepsy ([facility] 2005    last grand mal seizure 2017    Low-lying placenta in third trimester 10/4/2022    10/04/22 at [facility]: likely to resolve, bleeding precautions.   12/07/22 [facility]: Resolved       Past Surgical History:        Procedure Laterality Date    KNEE ARTHROSCOPY         Social History:    Social History     Tobacco Use    Smoking status: Never    Smokeless tobacco: Never   Substance Use Topics    Alcohol use: Not Currently                                Counseling given: Not Answered      Vital Signs (Current):   Vitals:    02/15/23 1116 02/15/23 1117 02/15/23 1118 02/15/23 1120   BP:  136/69 114/74 (!) 111/58   Pulse: (!) 104 86 88 88   Resp:       Temp:       TempSrc:       SpO2: 98%                                                 BP Readings from Last 3 Encounters:   02/15/23 (!) 111/58   02/08/23 122/70   02/01/23 120/70       NPO Status:                                                                                 BMI:   Wt Readings from Last 3 Encounters:   02/08/23 189 lb 3.2 oz (85.8 kg) 02/01/23 191 lb (86.6 kg)   01/27/23 187 lb 3.2 oz (84.9 kg)     There is no height or weight on file to calculate BMI.    CBC:   Lab Results   Component Value Date/Time    WBC 9.3 02/15/2023 07:37 AM    RBC 4.17 02/15/2023 07:37 AM    HGB 12.9 02/15/2023 07:37 AM    HCT 36.7 02/15/2023 07:37 AM    MCV 88.0 02/15/2023 07:37 AM    RDW 12.6 02/15/2023 07:37 AM     02/15/2023 07:37 AM       CMP: No results found for: NA, K, CL, CO2, BUN, CREATININE, GFRAA, AGRATIO, LABGLOM, GLUCOSE, GLU, PROT, CALCIUM, BILITOT, ALKPHOS, AST, ALT    POC Tests: No results for input(s): POCGLU, POCNA, POCK, POCCL, POCBUN, POCHEMO, POCHCT in the last 72 hours. Coags: No results found for: PROTIME, INR, APTT    HCG (If Applicable): No results found for: PREGTESTUR, PREGSERUM, HCG, HCGQUANT     ABGs: No results found for: PHART, PO2ART, NKF7LJN, UIK2DNW, BEART, V9RTLDUU     Type & Screen (If Applicable):  No results found for: LABABO, LABRH    Drug/Infectious Status (If Applicable):  No results found for: HIV, HEPCAB    COVID-19 Screening (If Applicable): No results found for: COVID19        Anesthesia Evaluation  Patient summary reviewed  Airway: Mallampati: I  TM distance: >3 FB   Neck ROM: full  Mouth opening: > = 3 FB   Dental: normal exam         Pulmonary:Negative Pulmonary ROS breath sounds clear to auscultation                             Cardiovascular:  Exercise tolerance: good (>4 METS),           Rhythm: regular  Rate: normal                    Neuro/Psych:               GI/Hepatic/Renal: Neg GI/Hepatic/Renal ROS            Endo/Other: Negative Endo/Other ROS                    Abdominal:             Vascular: negative vascular ROS. Other Findings:           Anesthesia Plan      epidural     ASA 2             Anesthetic plan and risks discussed with patient.                         Kumar Rouse MD   2/15/2023

## 2023-02-15 NOTE — ANESTHESIA POSTPROCEDURE EVALUATION
Department of Anesthesiology  Postprocedure Note    Patient: Chucky De Leon  MRN: [id number]  YOB: 1990  Date of evaluation: 2/15/2023      Procedure Summary     Date: 02/15/23 Room / Location:     Anesthesia Start: 1110 Anesthesia Stop: 3353    Procedure: Labor Analgesia Diagnosis:     Scheduled Providers:  Responsible Provider: Brittni Linares MD    Anesthesia Type: epidural ASA Status: 2          Anesthesia Type: No value filed.     Rain Phase I:      Rain Phase II:        Anesthesia Post Evaluation    Patient location during evaluation: PACU  Patient participation: complete - patient participated  Level of consciousness: awake and alert  Airway patency: patent  Nausea & Vomiting: no nausea  Cardiovascular status: hemodynamically stable  Respiratory status: acceptable  Hydration status: euvolemic

## 2023-02-15 NOTE — PROGRESS NOTES
Ly Ignacio at bedside at 1110. Assisted pt to sitting up on bedside at 1110. Timeout completed at [phone] with MD and myself at bedside. Test dose given at 1115. Negative reaction. Dose given at 1120. Pt assisted to lying back in left tilt position. See anesthesia record for details. See vital sign flow sheet for BP. Tolerated procedure well.

## 2023-02-15 NOTE — PROGRESS NOTES
3946-  Pt admitted   for induction of labor. IV started and labs sent. Consents signed and witnessed. 0800- SVE 1-2/50/-2. Pitocin started. FHR reactive. 0830-  Dr Adrián Armstrong at bedside. Strip reviewed. SVE AROM mod amt clear fluid. 1050- Pt request epidural.  IV bolus infusing. OD/ Suicide attempt

## 2023-02-15 NOTE — PROGRESS NOTES
Update Note:    Pt resting comfortably with epidural in place.      SVE: 4/90/-2  AROM at 0830, clear fluid    FHTs: baseline 120s, +accels, early decels, mod federica  Cxns: q2-3 mins    Plan:  Continue pitocin      Rena Butterfield MD  [facility]

## 2023-02-16 PROCEDURE — 6370000000 HC RX 637 (ALT 250 FOR IP): Performed by: STUDENT IN AN ORGANIZED HEALTH CARE EDUCATION/TRAINING PROGRAM

## 2023-02-16 PROCEDURE — 1100000000 HC RM PRIVATE

## 2023-02-16 RX ORDER — METHYLERGONOVINE MALEATE 0.2 MG/ML
200 INJECTION INTRAVENOUS PRN
Status: DISCONTINUED | OUTPATIENT
Start: 2023-02-16 | End: 2023-02-16

## 2023-02-16 RX ORDER — CARBOPROST TROMETHAMINE 250 UG/ML
250 INJECTION, SOLUTION INTRAMUSCULAR PRN
Status: DISCONTINUED | OUTPATIENT
Start: 2023-02-16 | End: 2023-02-16

## 2023-02-16 RX ORDER — MISOPROSTOL 200 UG/1
800 TABLET ORAL PRN
Status: DISCONTINUED | OUTPATIENT
Start: 2023-02-16 | End: 2023-02-16

## 2023-02-16 RX ORDER — TRANEXAMIC ACID 10 MG/ML
1000 INJECTION, SOLUTION INTRAVENOUS
Status: DISCONTINUED | OUTPATIENT
Start: 2023-02-16 | End: 2023-02-16

## 2023-02-16 RX ADMIN — DOCUSATE SODIUM 100 MG: 100 CAPSULE ORAL at 19:35

## 2023-02-16 RX ADMIN — ACETAMINOPHEN 1000 MG: 500 TABLET, FILM COATED ORAL at 11:59

## 2023-02-16 RX ADMIN — OXYCODONE 5 MG: 5 TABLET ORAL at 05:32

## 2023-02-16 RX ADMIN — DOCUSATE SODIUM 100 MG: 100 CAPSULE ORAL at 08:04

## 2023-02-16 RX ADMIN — ACETAMINOPHEN 1000 MG: 500 TABLET, FILM COATED ORAL at 03:29

## 2023-02-16 RX ADMIN — IBUPROFEN 600 MG: 600 TABLET, FILM COATED ORAL at 23:25

## 2023-02-16 RX ADMIN — ACETAMINOPHEN 1000 MG: 500 TABLET, FILM COATED ORAL at 19:34

## 2023-02-16 RX ADMIN — IBUPROFEN 600 MG: 600 TABLET, FILM COATED ORAL at 08:04

## 2023-02-16 RX ADMIN — IBUPROFEN 600 MG: 600 TABLET, FILM COATED ORAL at 15:57

## 2023-02-16 RX ADMIN — WITCH HAZEL: 500 SOLUTION RECTAL; TOPICAL at 20:56

## 2023-02-16 NOTE — CARE COORDINATION
Chart reviewed - no needs identified. SW met with patient to complete initial assessment. Patient without a PCP. With patient's permission, referral made to [facility] PCP Coordinator. Patient denies any history of postpartum depression/anxiety. Patient given informational packet on  mood & anxiety disorders (resources/education). Family denies any additional needs from  at this time. Family has 's contact information should any needs/questions arise.     MORIS Humphreys, [address] [facility]   [phone]

## 2023-02-16 NOTE — PROGRESS NOTES
Post Partum day Jose is a 34 y.o. female,   [address] well.  Moderate cramps   Ambulating well, voiding well   Bleeding decreasing   Breast-feeding    Vitals:    02/15/23 1830 02/15/23 1931 02/15/23 2330 23 0800   BP: 126/71 125/71 105/76 122/61   Pulse: 87 99 90 100   Resp: 18 17 18 17   Temp: 98.1 °F (36.7 °C) 98 °F (36.7 °C) 97.7 °F (36.5 °C) 97.5 °F (36.4 °C)   TempSrc: Oral Oral Oral Oral   SpO2:  98% 98% 99%   Weight:       Height:         Lungs- non-labored respirations  CVS- regular rate, normal peripheral perfusion  Abd- Soft,  Non tender   Fundus- Firm, appropriately tender   Lochia- Normal   extrem-  Non tender    Lab Results   Component Value Date    WBC 9.3 02/15/2023    HGB 12.9 02/15/2023    HCT 36.7 02/15/2023    MCV 88.0 02/15/2023     02/15/2023       Imp- Stable PP 1 s/p   Declines discharge home today     Plan-   Routine postpartum care  DC home tmrw    Jose Geronimo MD

## 2023-02-16 NOTE — PROGRESS NOTES
Patient up to bathroom with this RN assistance. Pt voided 1000 ml. Evy-care taught and completed. Questions encouraged and answered. Patient ambulating without difficulty, encouraged to call for needs or concerns. Verbalizes understanding.

## 2023-02-17 VITALS
BODY MASS INDEX: 33.49 KG/M2 | TEMPERATURE: 97.6 F | HEART RATE: 66 BPM | OXYGEN SATURATION: 99 % | HEIGHT: 63 IN | WEIGHT: 189 LBS | RESPIRATION RATE: 17 BRPM | SYSTOLIC BLOOD PRESSURE: 126 MMHG | DIASTOLIC BLOOD PRESSURE: 72 MMHG

## 2023-02-17 PROCEDURE — 6370000000 HC RX 637 (ALT 250 FOR IP): Performed by: STUDENT IN AN ORGANIZED HEALTH CARE EDUCATION/TRAINING PROGRAM

## 2023-02-17 RX ORDER — IBUPROFEN 600 MG/1
600 TABLET ORAL EVERY 6 HOURS PRN
Qty: 40 TABLET | Refills: 1 | Status: SHIPPED | OUTPATIENT
Start: 2023-02-17

## 2023-02-17 RX ADMIN — IBUPROFEN 600 MG: 600 TABLET, FILM COATED ORAL at 09:14

## 2023-02-17 RX ADMIN — DOCUSATE SODIUM 100 MG: 100 CAPSULE ORAL at 09:13

## 2023-02-17 RX ADMIN — ACETAMINOPHEN 1000 MG: 500 TABLET, FILM COATED ORAL at 03:31

## 2023-02-17 RX ADMIN — Medication: at 11:59

## 2023-02-17 NOTE — DISCHARGE SUMMARY
[address] [facility] Discharge Summary     Patient ID:  Veronica Durand  [id number]  59 y.o.  1990    Admit date: 2/15/2023    Discharge date and time: No discharge date for patient encounter. Admitting Physician: Ninoska Webb MD     Discharge Physician: Jocelyne Sandoval M.D. Admission Diagnoses: Normal labor [O80, Z37.9]    Problem List: Hospital - Principal Problem (Resolved):    Normal labor and delivery  Active Problems:    * No active hospital problems. *   ; Other -   Patient Active Problem List   Diagnosis    Epilepsy affecting pregnancy in third trimester ([facility]    High-risk pregnancy in third trimester    Attention deficit hyperactivity disorder    Obesity affecting pregnancy in third trimester    Accessory placenta, third trimester        Discharge Diagnoses: Normal labor [O80, Z37.9]    Hospital Course: Veronica Durand had unremarkeable progressive recovery. and Eating, ambulating, and voiding in a routine manner. Significant Diagnostic Studies: No results found for this or any previous visit (from the past 24 hour(s)). Procedures: spontaneous vaginal delivery    Discharge Exam:  /72   Pulse 66   Temp 97.6 °F (36.4 °C) (Oral)   Resp 17   Ht 5' 3\" (1.6 m)   Wt 189 lb (85.7 kg)   LMP 04/26/2022 (Exact Date)   SpO2 99%   Breastfeeding Unknown   BMI 33.48 kg/m²      Heart: regular rate and rhythm, S1, S2 normal, no murmur, click, rub or gallop  Lungs:clear to auscultation bilaterally  Extremities: normal, atraumatic, no cyanosis or edema.  No DVT  Incision/episiotomy: no significant drainage, no dehiscence, no significant erythema    Patient Instructions:   Current Discharge Medication List        START taking these medications    Details   ibuprofen (ADVIL;MOTRIN) 600 MG tablet Take 1 tablet by mouth every 6 hours as needed for Pain  Qty: 40 tablet, Refills: 1           CONTINUE these medications which have NOT CHANGED    Details   calcium carbonate (OSCAL) 500 MG TABS tablet Take 500 mg by mouth daily      ferrous sulfate (IRON 325) 325 (65 Fe) MG tablet Take 325 mg by mouth daily (with breakfast)      folic acid (FOLVITE) 584 MCG tablet Take 400 mcg by mouth in the morning. lamoTRIgine (LAMICTAL) 200 MG tablet Take 200 mg by mouth 2 times daily      Prenat-Fe Carbonyl-FA-Omega 3 (ONE-A-DAY WOMENS PRENATAL 1 PO) Take by mouth            Activity: physical activity is restricted per discharge instructions  Diet: resume normal diet  Wound Care: Keep wound clean and dry, as directed    Follow-up with Teresita in 2 weeks.     Signed:  Brigette Carrasquillo MD  2/17/2023  11:16 AM

## 2023-02-17 NOTE — DISCHARGE INSTRUCTIONS
After Your Delivery (the Postpartum Period): Care Instructions  Overview     Congratulations on the birth of your baby. Like pregnancy, the  period can be a time of excitement, nkechi, and exhaustion. You may look at your wondrous little baby and feel happy. You may also be overwhelmed by your new sleep hours and new responsibilities. At first, babies often sleep during the days and are awake at night. They do not have a pattern or routine. They may make sudden gasps, jerk themselves awake, or look like they have crossed eyes. These are all normal, and they may even make you smile. In these first weeks after delivery, try to take good care of yourself. It may take 4 to 6 weeks to feel like yourself again, and possibly longer if you had a  birth. You will likely feel very tired for several weeks. Your days will be full of ups and downs, but lots of nkechi as well. Follow-up care is a key part of your treatment and safety. Be sure to make and go to all appointments, and call your doctor if you are having problems. It's also a good idea to know your test results and keep a list of the medicines you take. How can you care for yourself at home? Take care of your body after delivery  Use pads instead of tampons for the bloody flow that may last as long as 2 weeks. Ease cramps with ibuprofen (Advil, Motrin). Ease soreness of hemorrhoids and the area between your vagina and rectum with ice compresses or witch hazel pads. Ease constipation by drinking lots of fluid and eating high-fiber foods. Ask your doctor about over-the-counter stool softeners. Cleanse yourself with a gentle squeeze of warm water from a bottle instead of wiping with toilet paper. Take a sitz bath in warm water several times a day. Wear a good nursing bra. Ease sore and swollen breasts with warm, wet washcloths. If you aren't breastfeeding, use ice rather than heat for breast soreness.   Your period may not start for several months if you are breastfeeding. You may bleed more, and longer at first, than you did before you got pregnant. Wait until you are healed (about 4 to 6 weeks) before you have sex. Ask your doctor when it is okay for you to have sex. Try not to travel with your baby for 5 or 6 weeks. If you take a long car trip, make frequent stops to walk around and stretch. Avoid exhaustion  Rest every day. Try to nap when your baby naps. Ask another adult to be with you for a few days after delivery. Plan for  if you have other children. Stay flexible so you can eat at odd hours and sleep when you need to. Both you and your baby are making new schedules. Plan small trips to get out of the house. Change can make you feel less tired. Ask for help with housework, cooking, and shopping. Remind yourself that your job is to care for your baby. Know about help for postpartum depression  \"Baby blues\" are common for the first 1 to 2 weeks after birth. You may cry or feel sad or irritable for no reason. Rest whenever you can. Being tired makes it harder to handle your emotions. Go for walks with your baby. Talk to your partner, friends, and family about your feelings. If your symptoms last for more than a few weeks, or if you feel very depressed, ask your doctor for help. Postpartum depression can be treated. Support groups and counseling can help. Sometimes medicine can also help. Stay healthy  Eat healthy foods so you have more energy. If you breastfeed, avoid drugs. If you quit smoking during pregnancy, try to stay smoke-free. If you choose to have a drink now and then, have only one drink, and limit the number of occasions that you have a drink. Wait to breastfeed at least 2 hours after you have a drink to reduce the amount of alcohol the baby may get in the milk. Start daily exercise after 4 to 6 weeks, but rest when you feel tired. Learn exercises to tone your belly.  Try Kegel exercises to regain strength in your pelvic muscles. You can do these exercises while you stand or sit. (If doing these exercises causes pain, stop doing them and talk with your doctor.)  Squeeze your muscles as if you were trying not to pass gas. Or squeeze your muscles as if you were stopping the flow of urine. Your belly, legs, and buttocks shouldn't move. Hold the squeeze for 3 seconds, then relax for 5 to 10 seconds. Start with 3 seconds, then add 1 second each week until you are able to squeeze for 10 seconds. Repeat the exercise 10 times a session. Do 3 to 8 sessions a day. Find a class for you and your baby that has an exercise time. If you had a  birth, give yourself a bit more time before you exercise, and be careful. When should you call for help? Share this information with your partner, family, or a friend. They can help you watch for warning signs. Call 911  anytime you think you may need emergency care. For example, call if:    You have thoughts of harming yourself, your baby, or another person. You passed out (lost consciousness). You have chest pain, are short of breath, or cough up blood. You have a seizure. Call your doctor now or seek immediate medical care if:    You have signs of hemorrhage (too much bleeding), such as:  Heavy vaginal bleeding. This means that you are soaking through one or more pads in an hour. Or you pass blood clots bigger than an egg. Feeling dizzy or lightheaded, or you feel like you may faint. Feeling so tired or weak that you cannot do your usual activities. A fast or irregular heartbeat. New or worse belly pain. You have signs of infection, such as:  A fever. Vaginal discharge that smells bad. New or worse belly pain. You have symptoms of a blood clot in your leg (called a deep vein thrombosis), such as:  Pain in the calf, back of the knee, thigh, or groin. Redness and swelling in your leg or groin.      You have signs of preeclampsia, such as:  Sudden swelling of your face, hands, or feet. New vision problems (such as dimness, blurring, or seeing spots). A severe headache. Watch closely for changes in your health, and be sure to contact your doctor if:    Your vaginal bleeding isn't decreasing. You feel sad, anxious, or hopeless for more than a few days. You are having problems with your breasts or breastfeeding. Where can you learn more? Go to http://www.woods.com/ and enter A461 to learn more about \"After Your Delivery (the Postpartum Period): Care Instructions. \"  Current as of: February 23, 2022               Content Version: 13.5  © 4902-4254 [employer]. Care instructions adapted under license by [facility] ([facility]). If you have questions about a medical condition or this instruction, always ask your healthcare professional. [place] [address] any warranty or liability for your use of this information. DISCHARGE SUMMARY from Nurse    What to do at home:  Nothing in vagina for 6 weeks, No tub baths or swimming, showers only  Call MD office or go to the ER if experiencing fever (greater than 100.4 deg F), heavy vaginal bleeding greater than full soaking of 1 pad per hour x 2 or more hours, foul smelling vaginal discharge, thoughts of harm to self or others, pain uncontrolled with oral medication, or any other major medical concerns. These are general instructions for a healthy lifestyle:    No smoking/ No tobacco products/ Avoid exposure to second hand smoke  Surgeon General's Warning:  Quitting smoking now greatly reduces serious risk to your health.     Obesity, smoking, and sedentary lifestyle greatly increases your risk for illness    A healthy diet, regular physical exercise & weight monitoring are important for maintaining a healthy lifestyle    You may be retaining fluid if you have a history of heart failure or if you experience any of the following symptoms:  Weight gain of 3 pounds or more overnight or 5 pounds in a week, increased swelling in our hands or feet or shortness of breath while lying flat in bed. Please call your doctor as soon as you notice any of these symptoms; do not wait until your next office visit. The discharge information has been reviewed with the {PATIENT PARENT GUARDIAN:80077}. The {PATIENT PARENT GUARDIAN:82539} verbalized understanding. Discharge medications reviewed with the {Dishcarge meds reviewed LADQ:33683} and appropriate educational materials and side effects teaching were provided.   ___________________________________________________________________________________________________________________________________

## 2023-02-21 ENCOUNTER — HOSPITAL ENCOUNTER (OUTPATIENT)
Age: 33
Discharge: HOME | End: 2023-02-22
Attending: OBSTETRICS & GYNECOLOGY | Admitting: OBSTETRICS & GYNECOLOGY
Payer: COMMERCIAL

## 2023-02-21 ENCOUNTER — APPOINTMENT (OUTPATIENT)
Dept: GENERAL RADIOLOGY | Age: 33
End: 2023-02-21
Payer: COMMERCIAL

## 2023-02-21 LAB
ALBUMIN SERPL-MCNC: 3 G/DL (ref 3.5–5)
ALBUMIN/GLOB SERPL: 0.8 (ref 0.4–1.6)
ALP SERPL-CCNC: 167 U/L (ref 50–136)
ALT SERPL-CCNC: 28 U/L (ref 12–65)
ANION GAP SERPL CALC-SCNC: 9 MMOL/L (ref 2–11)
AST SERPL-CCNC: 19 U/L (ref 15–37)
BASOPHILS # BLD: 0 K/UL (ref 0–0.2)
BASOPHILS NFR BLD: 0 % (ref 0–2)
BILIRUB SERPL-MCNC: 0.4 MG/DL (ref 0.2–1.1)
BUN SERPL-MCNC: 18 MG/DL (ref 6–23)
CALCIUM SERPL-MCNC: 9.4 MG/DL (ref 8.3–10.4)
CHLORIDE SERPL-SCNC: 105 MMOL/L (ref 101–110)
CO2 SERPL-SCNC: 23 MMOL/L (ref 21–32)
CREAT SERPL-MCNC: 1.19 MG/DL (ref 0.6–1)
DIFFERENTIAL METHOD BLD: ABNORMAL
EOSINOPHIL # BLD: 0 K/UL (ref 0–0.8)
EOSINOPHIL NFR BLD: 0 % (ref 0.5–7.8)
ERYTHROCYTE [DISTWIDTH] IN BLOOD BY AUTOMATED COUNT: 12.1 % (ref 11.9–14.6)
GLOBULIN SER CALC-MCNC: 4 G/DL (ref 2.8–4.5)
GLUCOSE SERPL-MCNC: 115 MG/DL (ref 65–100)
HCT VFR BLD AUTO: 38.5 % (ref 35.8–46.3)
HGB BLD-MCNC: 13.2 G/DL (ref 11.7–15.4)
IMM GRANULOCYTES # BLD AUTO: 0.2 K/UL (ref 0–0.5)
IMM GRANULOCYTES NFR BLD AUTO: 1 % (ref 0–5)
LACTATE SERPL-SCNC: 1.1 MMOL/L (ref 0.4–2)
LYMPHOCYTES # BLD: 0.6 K/UL (ref 0.5–4.6)
LYMPHOCYTES NFR BLD: 4 % (ref 13–44)
MCH RBC QN AUTO: 30.6 PG (ref 26.1–32.9)
MCHC RBC AUTO-ENTMCNC: 34.3 G/DL (ref 31.4–35)
MCV RBC AUTO: 89.1 FL (ref 82–102)
MONOCYTES # BLD: 0.6 K/UL (ref 0.1–1.3)
MONOCYTES NFR BLD: 4 % (ref 4–12)
NEUTS SEG # BLD: 12.7 K/UL (ref 1.7–8.2)
NEUTS SEG NFR BLD: 90 % (ref 43–78)
NRBC # BLD: 0 K/UL (ref 0–0.2)
PLATELET # BLD AUTO: 268 K/UL (ref 150–450)
PMV BLD AUTO: 9.8 FL (ref 9.4–12.3)
POTASSIUM SERPL-SCNC: 3.2 MMOL/L (ref 3.5–5.1)
PROCALCITONIN SERPL-MCNC: 0.25 NG/ML (ref 0–0.49)
PROT SERPL-MCNC: 7 G/DL (ref 6.3–8.2)
RBC # BLD AUTO: 4.32 M/UL (ref 4.05–5.2)
SODIUM SERPL-SCNC: 137 MMOL/L (ref 133–143)
WBC # BLD AUTO: 14.2 K/UL (ref 4.3–11.1)

## 2023-02-21 PROCEDURE — 87635 SARS-COV-2 COVID-19 AMP PRB: CPT

## 2023-02-21 PROCEDURE — 83605 ASSAY OF LACTIC ACID: CPT

## 2023-02-21 PROCEDURE — 87088 URINE BACTERIA CULTURE: CPT

## 2023-02-21 PROCEDURE — 80053 COMPREHEN METABOLIC PANEL: CPT

## 2023-02-21 PROCEDURE — 36415 COLL VENOUS BLD VENIPUNCTURE: CPT

## 2023-02-21 PROCEDURE — 84145 PROCALCITONIN (PCT): CPT

## 2023-02-21 PROCEDURE — 71045 X-RAY EXAM CHEST 1 VIEW: CPT

## 2023-02-21 PROCEDURE — 85025 COMPLETE CBC W/AUTO DIFF WBC: CPT

## 2023-02-21 PROCEDURE — 87502 INFLUENZA DNA AMP PROBE: CPT

## 2023-02-21 PROCEDURE — 87186 SC STD MICRODIL/AGAR DIL: CPT

## 2023-02-21 PROCEDURE — 87086 URINE CULTURE/COLONY COUNT: CPT

## 2023-02-21 PROCEDURE — 87040 BLOOD CULTURE FOR BACTERIA: CPT

## 2023-02-21 RX ORDER — SODIUM CHLORIDE, SODIUM LACTATE, POTASSIUM CHLORIDE, AND CALCIUM CHLORIDE .6; .31; .03; .02 G/100ML; G/100ML; G/100ML; G/100ML
30 INJECTION, SOLUTION INTRAVENOUS ONCE
Status: COMPLETED | OUTPATIENT
Start: 2023-02-21 | End: 2023-02-22

## 2023-02-22 VITALS
SYSTOLIC BLOOD PRESSURE: 110 MMHG | OXYGEN SATURATION: 96 % | RESPIRATION RATE: 20 BRPM | TEMPERATURE: 99.3 F | DIASTOLIC BLOOD PRESSURE: 55 MMHG | HEART RATE: 109 BPM

## 2023-02-22 LAB
FLUAV RNA SPEC QL NAA+PROBE: NOT DETECTED
FLUBV RNA SPEC QL NAA+PROBE: NOT DETECTED
LACTATE SERPL-SCNC: 0.9 MMOL/L (ref 0.4–2)
SARS-COV-2 RDRP RESP QL NAA+PROBE: NOT DETECTED
SOURCE: NORMAL

## 2023-02-22 PROCEDURE — 6360000002 HC RX W HCPCS: Performed by: OBSTETRICS & GYNECOLOGY

## 2023-02-22 PROCEDURE — 99282 EMERGENCY DEPT VISIT SF MDM: CPT

## 2023-02-22 PROCEDURE — 2580000003 HC RX 258: Performed by: EMERGENCY MEDICINE

## 2023-02-22 PROCEDURE — 2580000003 HC RX 258: Performed by: OBSTETRICS & GYNECOLOGY

## 2023-02-22 RX ORDER — AMOXICILLIN 500 MG/1
500 CAPSULE ORAL 3 TIMES DAILY
Qty: 21 CAPSULE | Refills: 0 | Status: SHIPPED | OUTPATIENT
Start: 2023-02-22 | End: 2023-03-01

## 2023-02-22 RX ORDER — METRONIDAZOLE 500 MG/1
500 TABLET ORAL 3 TIMES DAILY
Qty: 21 TABLET | Refills: 0 | Status: SHIPPED | OUTPATIENT
Start: 2023-02-22 | End: 2023-03-01

## 2023-02-22 RX ADMIN — SODIUM CHLORIDE, POTASSIUM CHLORIDE, SODIUM LACTATE AND CALCIUM CHLORIDE 1000 ML: 600; 310; 30; 20 INJECTION, SOLUTION INTRAVENOUS at 00:39

## 2023-02-22 RX ADMIN — AMPICILLIN SODIUM 2000 MG: 2 INJECTION, POWDER, FOR SOLUTION INTRAMUSCULAR; INTRAVENOUS at 00:38

## 2023-02-22 NOTE — DISCHARGE INSTRUCTIONS
PLEASE FOLLOW-UP WITH PRIMARY OB AT NEXT SCHEDULED VISIT. RETURN TO A DA IF EXPERIENCING CONTRACTIONS THAT BECOME STRONGER AND CLOSER TOGETHER, VAGINAL BLEEDING, LEAKING OF FLUIDS, OR DECREASED FETAL MOVEMENT. Postpartum: Care Instructions  Overview  After childbirth (postpartum period), your body goes through many changes. Some of these changes happen over several weeks. In the hours after delivery, your body will begin to recover from childbirth while it prepares to breastfeed your . You may feel emotional during this time. Your hormones can shift your mood without warning for no clear reason. In the first couple of weeks after childbirth, it's common to have emotions that change from happy to sad. You may find it hard to sleep. You may cry a lot. This is called the \"baby blues. \" These overwhelming emotions often go away within a couple of days or weeks. But it's important to discuss your feelings with your doctor. It's easy to get too tired and overwhelmed during the first weeks after childbirth. Don't try to do too much. Get rest whenever you can, accept help from others, and eat well and drink plenty of fluids. In the first couple of weeks after you give birth, your doctor or midwife may want to check in with you and make a plan for any follow-up care you may need. You will likely have a complete postpartum visit in the first 3 months after delivery. At that time, your doctor or midwife will check on your recovery from childbirth and see how you're doing with your emotions. You may also discuss your concerns or questions. Follow-up care is a key part of your treatment and safety. Be sure to make and go to all appointments, and call your doctor if you are having problems. It's also a good idea to know your test results and keep a list of the medicines you take. How can you care for yourself at home? Sleep or rest when your baby sleeps.   Get help with household chores from family or friends, if you can. Don't try to do it all yourself. If you have hemorrhoids or swelling or pain around the opening of your vagina, try using cold and heat. You can put ice or a cold pack on the area for 10 to 20 minutes at a time. Put a thin cloth between the ice and your skin. Also try sitting in a few inches of warm water (sitz bath) 3 times a day and after bowel movements. Take pain medicines exactly as directed. If the doctor gave you a prescription medicine for pain, take it as prescribed. If you are not taking a prescription pain medicine, ask your doctor if you can take an over-the-counter medicine. Eat more fiber to avoid constipation. Include foods such as whole-grain breads and cereals, raw vegetables, raw and dried fruits, and beans. Drink plenty of fluids. If you have kidney, heart, or liver disease and have to limit fluids, talk with your doctor before you increase the amount of fluids you drink. Do not rinse inside your vagina with fluids (douche). If you have stitches, keep the area clean by pouring or spraying warm water over the area outside your vagina and anus after you use the toilet. Keep a list of questions to ask your doctor or midwife. Your questions might be about:  Changes in your breasts, such as lumps or soreness. When to expect your menstrual period to start again. What form of birth control is best for you. Weight you have put on during the pregnancy. Exercise options. What foods and drinks are best for you, especially if you are breastfeeding. Problems you might be having with breastfeeding. When you can have sex. You may want to talk about lubricants for your vagina. Any feelings of sadness or restlessness that you are having. When should you call for help? Share this information with your partner, family, or a friend. They can help you watch for warning signs. Call 911  anytime you think you may need emergency care.  For example, call if:    You have thoughts of harming yourself, your baby, or another person. You passed out (lost consciousness). You have chest pain, are short of breath, or cough up blood. You have a seizure. Call your doctor now or seek immediate medical care if:    You have signs of hemorrhage (too much bleeding), such as:  Heavy vaginal bleeding. This means that you are soaking through one or more pads in an hour. Or you pass blood clots bigger than an egg. Feeling dizzy or lightheaded, or you feel like you may faint. Feeling so tired or weak that you cannot do your usual activities. A fast or irregular heartbeat. New or worse belly pain. You have signs of infection, such as:  A fever. Vaginal discharge that smells bad. New or worse belly pain. You have symptoms of a blood clot in your leg (called a deep vein thrombosis), such as:  Pain in the calf, back of the knee, thigh, or groin. Redness and swelling in your leg or groin. You have signs of preeclampsia, such as:  Sudden swelling of your face, hands, or feet. New vision problems (such as dimness, blurring, or seeing spots). A severe headache. Watch closely for changes in your health, and be sure to contact your doctor if:    Your vaginal bleeding isn't decreasing. You feel sad, anxious, or hopeless for more than a few days. You are having problems with your breasts or breastfeeding. Where can you learn more? Go to http://www.woods.com/ and enter Z768 to learn more about \"Postpartum: Care Instructions. \"  Current as of: February 23, 2022               Content Version: 13.5  © 7714-0513 [employer], [employer]. Care instructions adapted under license by [facility] [employer] [facility] ([facility]). If you have questions about a medical condition or this instruction, always ask your healthcare professional. [place] [address] any warranty or liability for your use of this information.          Urinary Tract Infection (UTI) in Women: Care Instructions  Overview     A urinary tract infection, or UTI, is a general term for an infection anywhere between the kidneys and the urethra (where urine comes out). Most UTIs are bladder infections. They often cause pain or burning when you urinate. UTIs are caused by bacteria and can be cured with antibiotics. Be sure to complete your treatment so that the infection does not get worse. Follow-up care is a key part of your treatment and safety. Be sure to make and go to all appointments, and call your doctor if you are having problems. It's also a good idea to know your test results and keep a list of the medicines you take. How can you care for yourself at home? Take your antibiotics as directed. Do not stop taking them just because you feel better. You need to take the full course of antibiotics. Drink extra water and other fluids for the next day or two. This will help make the urine less concentrated and help wash out the bacteria that are causing the infection. (If you have kidney, heart, or liver disease and have to limit fluids, talk with your doctor before you increase the amount of fluids you drink.)  Avoid drinks that are carbonated or have caffeine. They can irritate the bladder. Urinate often. Try to empty your bladder each time. To relieve pain, take a hot bath or lay a heating pad set on low over your lower belly or genital area. Never go to sleep with a heating pad in place. To prevent UTIs  Drink plenty of water each day. This helps you urinate often, which clears bacteria from your system. (If you have kidney, heart, or liver disease and have to limit fluids, talk with your doctor before you increase the amount of fluids you drink.)  Urinate when you need to. If you are sexually active, urinate right after you have sex. Change sanitary pads often. Avoid douches, bubble baths, feminine hygiene sprays, and other feminine hygiene products that have deodorants.   After going to the bathroom, wipe from front to back. When should you call for help? Call your doctor now or seek immediate medical care if:    Symptoms such as fever, chills, nausea, or vomiting get worse or appear for the first time. You have new pain in your back just below your rib cage. This is called flank pain. There is new blood or pus in your urine. You have any problems with your antibiotic medicine. Watch closely for changes in your health, and be sure to contact your doctor if:    You are not getting better after taking an antibiotic for 2 days. Your symptoms go away but then come back. Where can you learn more? Go to http://www.woods.com/ and enter K848 to learn more about \"Urinary Tract Infection (UTI) in Women: Care Instructions. \"  Current as of: June 16, 2022               Content Version: 13.5  © 3316-7820 [employer]wise, Incorporated. Care instructions adapted under license by [facility] ([facility]). If you have questions about a medical condition or this instruction, always ask your healthcare professional. [place] [address] any warranty or liability for your use of this information.

## 2023-02-22 NOTE — PROGRESS NOTES
Pt brought to DA from ER with c/o fever and chills. Denies swelling, redness, or foul vaginal discharge. Dr. Surjit Cantu at bedside.

## 2023-02-22 NOTE — PROGRESS NOTES
Discharge orders received from MD. Discharge instructions given to pt and pt verbalized understanding. D/c home accompanied by mom.

## 2023-02-22 NOTE — ED TRIAGE NOTES
OB ED Provider Note    Name: Ines Hill MRN: [id number]     YOB: 1990  Age: 34 y.o. Sex: female      Subjective:     Reason for Presentation to [facility] ED:  fever    History of Present Illness: Ms. Rayne Rizzo is a 34 y.o.  at 6 days postpartum from . Prenatal and delivery records reviewed. Her intrapartum course was uncomplicated. Began feeling bad today and having intermittent fever/chills in the afternoon. Measured temperature at home was 102.5. No shortness of breath, no breast pain, no calf pain. She does have a small amount of discomfort with urination, which may be associated with her stitches. She took ibuprofen/acetaminophen a couple of hours before coming in. She was initially triaged in the main ER where a fluid bolus was given and sepsis labs were ordered and drawn. OB History    Para Term  AB Living   3 2 2   1 2   SAB IAB Ectopic Molar Multiple Live Births   1       0 2      # Outcome Date GA Lbr Ousmane/2nd Weight Sex Delivery Anes PTL Lv   3 Term 02/15/23 39w0d 07:00 / 01:03 7 lb 6.5 oz (3.36 kg) M Vag-Spont EPI N MOHAMUD      Complications: Shoulder Dystocia   2 Term 20 39w0d  7 lb (3.175 kg) F Vag-Spont EPI  MOHAMUD      Birth Comments: Induced   1 SAB 2019 8w0d            Past Medical History:   Diagnosis Date    Attention deficit hyperactivity disorder     Attention deficit hyperactivity disorder     Epilepsy ([address]     last grand mal seizure 2017    Low-lying placenta in third trimester 10/4/2022    10/04/22 at [facility]: likely to resolve, bleeding precautions.   22 [facility]: Resolved     Past Surgical History:   Procedure Laterality Date    KNEE ARTHROSCOPY       Social History     Occupational History    Not on file   Tobacco Use    Smoking status: Never    Smokeless tobacco: Never   Vaping Use    Vaping Use: Never used   Substance and Sexual Activity    Alcohol use: Not Currently    Drug use: Never    Sexual activity: Yes     Partners: Male Allergies   Allergen Reactions    Apple Anaphylaxis     Prior to Admission medications    Medication Sig Start Date End Date Taking? Authorizing Provider   amoxicillin (AMOXIL) 500 MG capsule Take 1 capsule by mouth 3 times daily for 7 days 2/22/23 3/1/23 Yes Markos Camacho MD   metroNIDAZOLE (FLAGYL) 500 MG tablet Take 1 tablet by mouth 3 times daily for 7 days 2/22/23 3/1/23 Yes Markos Camacho MD   ibuprofen (ADVIL;MOTRIN) 600 MG tablet Take 1 tablet by mouth every 6 hours as needed for Pain 2/17/23   Carlos Philippe MD   calcium carbonate (OSCAL) 500 MG TABS tablet Take 500 mg by mouth daily    Historical Provider, MD   ferrous sulfate (IRON 325) 325 (65 Fe) MG tablet Take 325 mg by mouth daily (with breakfast)    Historical Provider, MD   folic acid (FOLVITE) 737 MCG tablet Take 400 mcg by mouth in the morning.     Historical Provider, MD   lamoTRIgine (LAMICTAL) 200 MG tablet Take 200 mg by mouth 2 times daily    Historical Provider, MD   Prenat-Fe Carbonyl-FA-Omega 3 (ONE-A-DAY WOMENS PRENATAL 1 PO) Take by mouth    Historical Provider, MD          Objective:     Physical Exam:  VITALS:  BP (!) 110/55   Pulse (!) 109   Temp 99.3 °F (37.4 °C) (Oral)   Resp 20   LMP 04/26/2022 (Exact Date)   SpO2 96%     CONSTITUTIONAL:  awake, alert, cooperative, no apparent distress, and appears stated age  BACK:  no CVA tenderness  LUNGS:  No increased work of breathing, good air exchange, clear to auscultation bilaterally, no crackles or wheezing  CARDIOVASCULAR:  regular rate and rhythm  ABDOMEN:  non-tender and fundus firm and non-tender  CHEST/BREASTS:  Breasts show no signs of mastitis  GENITAL/URINARY:  stitches intact without evidence of infection  SKIN:  normal skin color, texture, turgor and no redness, warmth, or swelling; previous IV sites show no signs of infection    GBS: was negative before delivery  Urine dip inconclusive  CBC/CMP normal  Lactate normal  CXR normal  Flu & COVID swabs negative  Blood & urine cultures pending    Assessment:  39w0d gestation  Postpartum fever of unknown origin ;  Most likely urinary source    Plan:  One dose IV antibiotics given here, then Rx empiric antibiotics while cultures are pending

## 2023-02-22 NOTE — ED TRIAGE NOTES
Pt arrives to ed with c/o fever and chills and extreme shakes. Pt is 5 days post partum. Hx of epilepsy and pt states she felt like she was close to seizing. Pt had vaginal birth, pt has significant tearing. Pt states her bleeding had begun to subside and now pt is now beginning to bleed again. Pain is mostly cramping in abd. Pt took tylenol and motrin at 2030. Pt states 102.5 fever at home.

## 2023-02-23 ENCOUNTER — POSTPARTUM VISIT (OUTPATIENT)
Dept: OBGYN CLINIC | Age: 33
End: 2023-02-23

## 2023-02-23 VITALS
HEIGHT: 63 IN | BODY MASS INDEX: 31.5 KG/M2 | WEIGHT: 177.8 LBS | DIASTOLIC BLOOD PRESSURE: 74 MMHG | SYSTOLIC BLOOD PRESSURE: 112 MMHG

## 2023-02-23 PROBLEM — O09.93 HIGH-RISK PREGNANCY IN THIRD TRIMESTER: Status: RESOLVED | Noted: 2022-08-09 | Resolved: 2023-02-23

## 2023-02-23 PROBLEM — O43.193 ACCESSORY PLACENTA, THIRD TRIMESTER: Status: RESOLVED | Noted: 2022-12-08 | Resolved: 2023-02-23

## 2023-02-23 PROBLEM — G40.909 EPILEPSY AFFECTING PREGNANCY IN THIRD TRIMESTER (HCC): Status: RESOLVED | Noted: 2022-08-09 | Resolved: 2023-02-23

## 2023-02-23 PROBLEM — O99.353 EPILEPSY AFFECTING PREGNANCY IN THIRD TRIMESTER (HCC): Status: RESOLVED | Noted: 2022-08-09 | Resolved: 2023-02-23

## 2023-02-23 PROBLEM — O99.213 OBESITY AFFECTING PREGNANCY IN THIRD TRIMESTER: Status: RESOLVED | Noted: 2022-12-07 | Resolved: 2023-02-23

## 2023-02-23 PROCEDURE — 99902 PR PRENATAL VISIT: CPT | Performed by: OBSTETRICS & GYNECOLOGY

## 2023-02-23 NOTE — PROGRESS NOTES
[id number] female 2 weeks s/p vaginal delivery with 2nd degree laceration. No complaints. Hasn't had intercourse since delivery. Pregnancy and delivery were uncomplicated. Patient feels comfortable with caring for the baby. Breastfeeding no. Plans vasectomy for birth control. Seen at ED on 2/21/23 with complaints of fever, chills and extreme shakes. Has history of epilepsy. Chest X-Ray was normal. Was discharged with Amoxicillin and Flagyl for UTI. Her urine culture has come back with sensitivities and identification are still pending. Overall she feels much better. Last pap: 08/17/2022 Negative. HPV Negative. Std's negative. History of GDM no    /74   Ht 5' 3\" (1.6 m)   Wt 177 lb 12.8 oz (80.6 kg)   LMP 04/26/2022 (Exact Date)   Breastfeeding No   BMI 31.50 kg/m²   Affect appropriate and bright  Abdomen soft and nontender. No masses noted. Normal externalia genitalia. Encounter Diagnosis   Name Primary? Postpartum care and examination Yes     We will follow-up on the sensitivities of her urine culture and adjust medication if necessary but she symptomatically and clinically appears improved    No orders of the defined types were placed in this encounter.

## 2023-02-24 LAB
BACTERIA SPEC CULT: ABNORMAL
SERVICE CMNT-IMP: ABNORMAL

## 2023-02-26 LAB
BACTERIA SPEC CULT: NORMAL
BACTERIA SPEC CULT: NORMAL
SERVICE CMNT-IMP: NORMAL
SERVICE CMNT-IMP: NORMAL

## 2023-03-20 ENCOUNTER — POSTPARTUM VISIT (OUTPATIENT)
Dept: OBGYN CLINIC | Age: 33
End: 2023-03-20

## 2023-03-20 VITALS
DIASTOLIC BLOOD PRESSURE: 70 MMHG | SYSTOLIC BLOOD PRESSURE: 112 MMHG | WEIGHT: 174.4 LBS | BODY MASS INDEX: 30.9 KG/M2 | HEIGHT: 63 IN

## 2023-03-20 PROCEDURE — 99902 PR PRENATAL VISIT: CPT | Performed by: OBSTETRICS & GYNECOLOGY

## 2023-03-20 NOTE — PROGRESS NOTES
Pt is here for 6 Week Postpartum Visit after vaginal delivery without complications or concerns. Normal exam.  Zafar Sour is appropriate. Mood is appropriate. Breasts are doing well. Pt is formula feeding.  to have vasectomy. Infant is thriving. She is given contraceptive options as indicated. She will follow up prn.